# Patient Record
Sex: FEMALE | Race: BLACK OR AFRICAN AMERICAN | NOT HISPANIC OR LATINO
[De-identification: names, ages, dates, MRNs, and addresses within clinical notes are randomized per-mention and may not be internally consistent; named-entity substitution may affect disease eponyms.]

---

## 2021-03-08 PROBLEM — E13.9 DIABETES MELLITUS OF OTHER TYPE WITHOUT COMPLICATION: Status: RESOLVED | Noted: 2021-03-08 | Resolved: 2021-03-08

## 2021-03-08 PROBLEM — Z78.9 NON-SMOKER: Status: ACTIVE | Noted: 2021-03-08

## 2021-03-08 PROBLEM — Z86.79 HISTORY OF HYPERTENSION: Status: RESOLVED | Noted: 2021-03-08 | Resolved: 2021-03-08

## 2021-03-08 RX ORDER — AMLODIPINE BESYLATE 5 MG/1
TABLET ORAL
Refills: 0 | Status: ACTIVE | COMMUNITY

## 2021-03-08 RX ORDER — GLIMEPIRIDE 4 MG/1
TABLET ORAL
Refills: 0 | Status: ACTIVE | COMMUNITY

## 2021-03-08 RX ORDER — INSULIN DETEMIR 100 [IU]/ML
INJECTION, SOLUTION SUBCUTANEOUS
Refills: 0 | Status: ACTIVE | COMMUNITY

## 2021-03-08 RX ORDER — METFORMIN HYDROCHLORIDE 625 MG/1
TABLET ORAL
Refills: 0 | Status: ACTIVE | COMMUNITY

## 2021-03-09 ENCOUNTER — NON-APPOINTMENT (OUTPATIENT)
Age: 46
End: 2021-03-09

## 2021-03-09 ENCOUNTER — APPOINTMENT (OUTPATIENT)
Dept: GYNECOLOGIC ONCOLOGY | Facility: CLINIC | Age: 46
End: 2021-03-09
Payer: COMMERCIAL

## 2021-03-09 VITALS
RESPIRATION RATE: 16 BRPM | SYSTOLIC BLOOD PRESSURE: 159 MMHG | DIASTOLIC BLOOD PRESSURE: 99 MMHG | HEART RATE: 94 BPM | HEIGHT: 66 IN | WEIGHT: 238 LBS | BODY MASS INDEX: 38.25 KG/M2

## 2021-03-09 DIAGNOSIS — E13.9 OTHER SPECIFIED DIABETES MELLITUS W/OUT COMPLICATIONS: ICD-10-CM

## 2021-03-09 DIAGNOSIS — Z78.9 OTHER SPECIFIED HEALTH STATUS: ICD-10-CM

## 2021-03-09 DIAGNOSIS — Z86.79 PERSONAL HISTORY OF OTHER DISEASES OF THE CIRCULATORY SYSTEM: ICD-10-CM

## 2021-03-09 PROCEDURE — 99072 ADDL SUPL MATRL&STAF TM PHE: CPT

## 2021-03-09 PROCEDURE — 99205 OFFICE O/P NEW HI 60 MIN: CPT

## 2021-03-09 NOTE — CHIEF COMPLAINT
[FreeTextEntry1] : [facility]\par [place] Office\par [address]\[place] [address]\par

## 2021-03-09 NOTE — ASSESSMENT
[FreeTextEntry1] : 44 yo female with fibroid uterus with possible sarcomatous changes. I discussed with patient in office today many options including surgical vs. non surgical. Non surgical options include a UAE. I discussed that she would need an EMB prior to rule out a malignancy which I discuss is low likelihood and a MRI. I discussed that her fibroid is rather larger and it may take time for the fibroid to be reabsorbed by her body. I discussed that when there is concern for a fibroid being related to a malignancy: being > 10 cm and imaging with concerning features which she has, and heavy and AUB which she does. I still believe she is at a low risk of a malignancy.\par \par \par I discussed at length with the patient the nature, purpose, risks, benefits, and alternatives of total abdominal hysterectomy and bilateral salpingectomy via a vertical, midline incision, possible bilateral pelvic and para-aortic lymphadenectomy and surgical staging.  The patient understands the risks to include (but not be limited to) bowel injury, bleeding (with the possible need for transfusion), bladder or ureteral injury, infections, protracted wound closure, deep venous thrombosis, and aire-operative death.  She is also aware of  the possibility of  a unrecognized surgical complication with need for subsequent re-exploration.  She also understands the rationale for surgical procedures such as omentectomy, or pelvic and para-aortic lymphadenectomy for the proper staging of a gynecological cancer.  She agrees to proceed.  She asked numerous questions which were answered to her satisfaction.  She understands the need for a pre-operative bowel preparation and agrees to comply with our instructions.

## 2021-03-09 NOTE — PHYSICAL EXAM
[Normal] : Examination for hernias: No hernia appreciated [de-identified] : Uterus mobile, concern for pedunculated fibroid, do not suspect it to be BENJY.  [de-identified] : Patient was interviewed and examined with chaperone present. Name of Chaperone: Prema Evangelista PA-C

## 2021-03-09 NOTE — END OF VISIT
[FreeTextEntry3] : This note accurately reflects the work and decisions made by me.\par Written by Prema Evangelista PA-C acting as a scribe for Dr. Katelynn Betancourt.

## 2021-03-09 NOTE — HISTORY OF PRESENT ILLNESS
[FreeTextEntry1] : 46 yo  via 1  LMP 2019, on Depoprovera (last inj. 2021) being referred by Dr. Saucedo for fibroids. MRI performed at  radiology 21 with multiple uterine fibroids with a dominant 14 cm fibroid seen which centrally has a 6 cm area of no significant enhancement and may represent red degeneration or sarcomatous changes. Left ovarian cystic changes with some adjacent fluid and/or lymphoceles. Patient reports she has been feeling pressure around her umbilicus as well as lower back pain and urinary frequency x a few months. She denies unintentional weight loss, dyspareunia, post coital bleeding, pelvic pain, change in bowel function. She denies having any biopsy done and no longer desires child bearing. \par \par Lpap: 2021 WNL per pt\par Lmammo: 2020 return in 6 months due to family history of breast cysts\par

## 2021-03-12 ENCOUNTER — NON-APPOINTMENT (OUTPATIENT)
Age: 46
End: 2021-03-12

## 2021-06-04 ENCOUNTER — APPOINTMENT (OUTPATIENT)
Dept: GYNECOLOGIC ONCOLOGY | Facility: CLINIC | Age: 46
End: 2021-06-04
Payer: COMMERCIAL

## 2021-06-04 PROCEDURE — 99441: CPT

## 2021-06-04 NOTE — END OF VISIT
[FreeTextEntry3] : Proceed with planned surgery [Time Spent: ___ minutes] : I have spent [unfilled] minutes of time on the encounter.

## 2021-06-04 NOTE — HISTORY OF PRESENT ILLNESS
[Home] : at home, [unfilled] , at the time of the visit. [Medical Office: ([facility])___] : at the medical office located in  [Verbal consent obtained from patient] : the patient, [unfilled] [FreeTextEntry1] : Pt is a 45 yo with large fibroid uterus with concern for sarcomatous degeneration. Recommendation for STARR, BS. The patient presents with her  regarding questions about surgery.

## 2021-06-04 NOTE — ASSESSMENT
[FreeTextEntry1] : Pt is a 45 yo with large fibroid uterus and imaging concerning for sarcomatous degeneration. She is scheduled for STARR, BS on 6/23. All questions  answered regarding surgical recovering, length of stay, type of surgical incision. Proceed with planned surgery.

## 2021-06-09 ENCOUNTER — OUTPATIENT (OUTPATIENT)
Dept: OUTPATIENT SERVICES | Facility: HOSPITAL | Age: 46
LOS: 1 days | End: 2021-06-09
Payer: COMMERCIAL

## 2021-06-09 ENCOUNTER — TRANSCRIPTION ENCOUNTER (OUTPATIENT)
Age: 46
End: 2021-06-09

## 2021-06-09 VITALS
TEMPERATURE: 97 F | RESPIRATION RATE: 20 BRPM | SYSTOLIC BLOOD PRESSURE: 130 MMHG | WEIGHT: 237.88 LBS | HEART RATE: 73 BPM | DIASTOLIC BLOOD PRESSURE: 94 MMHG | HEIGHT: 68 IN

## 2021-06-09 DIAGNOSIS — Z29.9 ENCOUNTER FOR PROPHYLACTIC MEASURES, UNSPECIFIED: ICD-10-CM

## 2021-06-09 DIAGNOSIS — Z01.818 ENCOUNTER FOR OTHER PREPROCEDURAL EXAMINATION: ICD-10-CM

## 2021-06-09 DIAGNOSIS — U07.1 COVID-19: ICD-10-CM

## 2021-06-09 DIAGNOSIS — D21.9 BENIGN NEOPLASM OF CONNECTIVE AND OTHER SOFT TISSUE, UNSPECIFIED: ICD-10-CM

## 2021-06-09 DIAGNOSIS — I10 ESSENTIAL (PRIMARY) HYPERTENSION: ICD-10-CM

## 2021-06-09 DIAGNOSIS — E11.9 TYPE 2 DIABETES MELLITUS WITHOUT COMPLICATIONS: ICD-10-CM

## 2021-06-09 LAB
A1C WITH ESTIMATED AVERAGE GLUCOSE RESULT: 6.9 % — HIGH (ref 4–5.6)
ANION GAP SERPL CALC-SCNC: 12 MMOL/L — SIGNIFICANT CHANGE UP (ref 5–17)
APTT BLD: 33.7 SEC — SIGNIFICANT CHANGE UP (ref 27.5–35.5)
BASOPHILS # BLD AUTO: 0.03 K/UL — SIGNIFICANT CHANGE UP (ref 0–0.2)
BASOPHILS NFR BLD AUTO: 0.4 % — SIGNIFICANT CHANGE UP (ref 0–2)
BLD GP AB SCN SERPL QL: SIGNIFICANT CHANGE UP
BUN SERPL-MCNC: 10.3 MG/DL — SIGNIFICANT CHANGE UP (ref 8–20)
CALCIUM SERPL-MCNC: 9.4 MG/DL — SIGNIFICANT CHANGE UP (ref 8.6–10.2)
CHLORIDE SERPL-SCNC: 109 MMOL/L — HIGH (ref 98–107)
CO2 SERPL-SCNC: 21 MMOL/L — LOW (ref 22–29)
CREAT SERPL-MCNC: 0.89 MG/DL — SIGNIFICANT CHANGE UP (ref 0.5–1.3)
EOSINOPHIL # BLD AUTO: 0.1 K/UL — SIGNIFICANT CHANGE UP (ref 0–0.5)
EOSINOPHIL NFR BLD AUTO: 1.5 % — SIGNIFICANT CHANGE UP (ref 0–6)
ESTIMATED AVERAGE GLUCOSE: 151 MG/DL — HIGH (ref 68–114)
GLUCOSE SERPL-MCNC: 93 MG/DL — SIGNIFICANT CHANGE UP (ref 70–99)
HCG SERPL-ACNC: <4 MIU/ML — SIGNIFICANT CHANGE UP
HCT VFR BLD CALC: 38.3 % — SIGNIFICANT CHANGE UP (ref 34.5–45)
HGB BLD-MCNC: 12.2 G/DL — SIGNIFICANT CHANGE UP (ref 11.5–15.5)
IMM GRANULOCYTES NFR BLD AUTO: 0.4 % — SIGNIFICANT CHANGE UP (ref 0–1.5)
INR BLD: 1.15 RATIO — SIGNIFICANT CHANGE UP (ref 0.88–1.16)
LYMPHOCYTES # BLD AUTO: 2.62 K/UL — SIGNIFICANT CHANGE UP (ref 1–3.3)
LYMPHOCYTES # BLD AUTO: 39.2 % — SIGNIFICANT CHANGE UP (ref 13–44)
MCHC RBC-ENTMCNC: 27.5 PG — SIGNIFICANT CHANGE UP (ref 27–34)
MCHC RBC-ENTMCNC: 31.9 GM/DL — LOW (ref 32–36)
MCV RBC AUTO: 86.5 FL — SIGNIFICANT CHANGE UP (ref 80–100)
MONOCYTES # BLD AUTO: 0.35 K/UL — SIGNIFICANT CHANGE UP (ref 0–0.9)
MONOCYTES NFR BLD AUTO: 5.2 % — SIGNIFICANT CHANGE UP (ref 2–14)
NEUTROPHILS # BLD AUTO: 3.55 K/UL — SIGNIFICANT CHANGE UP (ref 1.8–7.4)
NEUTROPHILS NFR BLD AUTO: 53.3 % — SIGNIFICANT CHANGE UP (ref 43–77)
PLATELET # BLD AUTO: 202 K/UL — SIGNIFICANT CHANGE UP (ref 150–400)
POTASSIUM SERPL-MCNC: 4.3 MMOL/L — SIGNIFICANT CHANGE UP (ref 3.5–5.3)
POTASSIUM SERPL-SCNC: 4.3 MMOL/L — SIGNIFICANT CHANGE UP (ref 3.5–5.3)
PROTHROM AB SERPL-ACNC: 13.2 SEC — SIGNIFICANT CHANGE UP (ref 10.6–13.6)
RBC # BLD: 4.43 M/UL — SIGNIFICANT CHANGE UP (ref 3.8–5.2)
RBC # FLD: 13.6 % — SIGNIFICANT CHANGE UP (ref 10.3–14.5)
SODIUM SERPL-SCNC: 142 MMOL/L — SIGNIFICANT CHANGE UP (ref 135–145)
WBC # BLD: 6.68 K/UL — SIGNIFICANT CHANGE UP (ref 3.8–10.5)
WBC # FLD AUTO: 6.68 K/UL — SIGNIFICANT CHANGE UP (ref 3.8–10.5)

## 2021-06-09 PROCEDURE — 93010 ELECTROCARDIOGRAM REPORT: CPT

## 2021-06-09 PROCEDURE — 93005 ELECTROCARDIOGRAM TRACING: CPT

## 2021-06-09 PROCEDURE — G0463: CPT

## 2021-06-09 RX ORDER — CEFAZOLIN SODIUM 1 G
2000 VIAL (EA) INJECTION ONCE
Refills: 0 | Status: COMPLETED | OUTPATIENT
Start: 2021-06-18 | End: 2021-06-18

## 2021-06-09 RX ORDER — SODIUM CHLORIDE 9 MG/ML
3 INJECTION INTRAMUSCULAR; INTRAVENOUS; SUBCUTANEOUS EVERY 8 HOURS
Refills: 0 | Status: DISCONTINUED | OUTPATIENT
Start: 2021-06-18 | End: 2021-06-18

## 2021-06-09 RX ORDER — METRONIDAZOLE 500 MG
500 TABLET ORAL ONCE
Refills: 0 | Status: COMPLETED | OUTPATIENT
Start: 2021-06-18 | End: 2021-06-18

## 2021-06-09 NOTE — H&P PST ADULT - BLOOD AVOIDANCE/RESTRICTIONS, PROFILE
Pt returned call, appt moved to 8/26, no need to hold blood thinner, instructions mailed, pre-auth submitted.    none

## 2021-06-09 NOTE — H&P PST ADULT - NSANTHOSAYNRD_GEN_A_CORE
No. NEHEMIAH screening performed.  STOP BANG Legend: 0-2 = LOW Risk; 3-4 = INTERMEDIATE Risk; 5-8 = HIGH Risk

## 2021-06-09 NOTE — H&P PST ADULT - NSICDXPASTMEDICALHX_GEN_ALL_CORE_FT
PAST MEDICAL HISTORY:  Diabetes     Fibroids     GERD (Gastroesophageal Reflux Disease)     HTN (Hypertension)      PAST MEDICAL HISTORY:  Anemia     Diabetes     Fibroids     GERD (Gastroesophageal Reflux Disease)     HTN (Hypertension)

## 2021-06-09 NOTE — PATIENT PROFILE ADULT - VISION (WITH CORRECTIVE LENSES IF THE PATIENT USUALLY WEARS THEM):
Glasses for distance and night driving/Partially impaired: cannot see medication labels or newsprint, but can see obstacles in path, and the surrounding layout; can count fingers at arm's length

## 2021-06-09 NOTE — H&P PST ADULT - NSICDXFAMILYHX_GEN_ALL_CORE_FT
FAMILY HISTORY:  Father  Still living? Yes, Estimated age: 61-70  Family history of DVT, Age at diagnosis: Age Unknown  FH: HTN (hypertension), Age at diagnosis: Age Unknown  FH: type 2 diabetes, Age at diagnosis: Age Unknown    Mother  Still living? Yes, Estimated age: 61-70  FH: CHF (congestive heart failure), Age at diagnosis: Age Unknown  FH: HTN (hypertension), Age at diagnosis: Age Unknown  FH: type 2 diabetes, Age at diagnosis: Age Unknown

## 2021-06-09 NOTE — PATIENT PROFILE ADULT - NSPROHMSYMPCOND_GEN_A_NUR
NP, instructed pt on pre-op instructions/teaching, tips for safer surgery, pain management scale, pre-surgical infection prevention instructions, covid swab appt info given for (6/15), medical and cardiac clearances pending, diabetes club info given. Pt verbalized understanding of all instructions given./diabetes NP, instructed pt on pre-op instructions/teaching, tips for safer surgery, pain management scale, pre-surgical infection prevention instructions, covid swab appt info given for (6/15), follow colon prep as per surgeon, medical and cardiac clearances pending, diabetes club info given. Pt verbalized understanding of all instructions given./diabetes

## 2021-06-09 NOTE — H&P PST ADULT - NSCAFFEINETYPE_GEN_ALL_CORE_SD
Prescription refill request on:      Disp Refills Start End    insulin glargine (LANTUS) 100 UNIT/ML vial solution 60 mL 0 10/31/2019     Sig: Inject 10 units into the skin every morning and 42 units nightly    Sent to pharmacy as: Insulin Glargine 100 UNIT/ML Subcutaneous Solution    Class: Eprescribe      Last A1C 10/29/2019  Last office visit 10/31/2019  Next appointment none     tea

## 2021-06-09 NOTE — H&P PST ADULT - ASSESSMENT
46 yr old female in NAD presents with c/o fibroids and lower back discomfort. pt has history of, diabetes, htn  and  anemia with heavy periods, treated with depo provera injection last given 2021 . Pt is schedules for hysterectomy with DR. Betancourt.   OPIOID RISK TOOL    CARLIE EACH BOX THAT APPLIES AND ADD TOTALS AT THE END    FAMILY HISTORY OF SUBSTANCE ABUSE                 FEMALE         MALE                                                Alcohol                             [  ]1 pt          [  ]3pts                                               Illegal Durgs                     [  ]2 pts        [  ]3pts                                               Rx Drugs                           [  ]4 pts        [  ]4 pts    PERSONAL HISTORY OF SUBSTANCE ABUSE                                                                                          Alcohol                             [  ]3 pts       [  ]3 pts                                               Illegal Durgs                     [  ]4 pts        [  ]4 pts                                               Rx Drugs                           [  ]5 pts        [  ]5 pts    AGE BETWEEN 16-45 YEARS                                      [X  ]1 pt         [  ]1 pt    HISTORY OF PREADOLESCENT   SEXUAL ABUSE                                                             [  ]3 pts        [  ]0pts    PSYCHOLOGICAL DISEASE                     ADD, OCD, Bipolar, Schizophrenia        [  ]2 pts         [  ]2 pts                      Depression                                               [  ]1 pt           [  ]1 pt           SCORING TOTAL   (add numbers and type here)              (**1*)                                     A score of 3 or lower indicated LOW risk for future opiod abuse  A score of 4 to 7 indicated moderate risk for future opiod abuse  A score of 8 or higher indicates a high risk for opiod abuse  CAPRINI VTE 2.0 SCORE [CLOT updated 2019]    AGE RELATED RISK FACTORS                                                       MOBILITY RELATED FACTORS  [ X] Age 41-60 years                                            (1 Point)                    [ ] Bed rest                                                        (1 Point)  [ ] Age: 61-74 years                                           (2 Points)                  [ ] Plaster cast                                                   (2 Points)  [ ] Age= 75 years                                              (3 Points)                    [ ] Bed bound for more than 72 hours                 (2 Points)    DISEASE RELATED RISK FACTORS                                               GENDER SPECIFIC FACTORS  [ ] Edema in the lower extremities                       (1 Point)              [ ] Pregnancy                                                     (1 Point)  [ ] Varicose veins                                               (1 Point)                     [ ] Post-partum < 6 weeks                                   (1 Point)             [X ] BMI > 25 Kg/m2                                            (1 Point)                     [ ] Hormonal therapy  or oral contraception          (1 Point)                 [ ] Sepsis (in the previous month)                        (1 Point)               [ ] History of pregnancy complications                 (1 point)  [ ] Pneumonia or serious lung disease                                               [ ] Unexplained or recurrent                     (1 Point)           (in the previous month)                               (1 Point)  [ ] Abnormal pulmonary function test                     (1 Point)                 SURGERY RELATED RISK FACTORS  [ ] Acute myocardial infarction                              (1 Point)               [ ]  Section                                             (1 Point)  [ ] Congestive heart failure (in the previous month)  (1 Point)      [ ] Minor surgery                                                  (1 Point)   [ ] Inflammatory bowel disease                             (1 Point)               [ ] Arthroscopic surgery                                        (2 Points)  [ ] Central venous access                                      (2 Points)                [X ] General surgery lasting more than 45 minutes (2 points)  [ ] Malignancy- Present or previous                   (2 Points)                [ ] Elective arthroplasty                                         (5 points)    [ ] Stroke (in the previous month)                          (5 Points)                                                                                                                                                           HEMATOLOGY RELATED FACTORS                                                 TRAUMA RELATED RISK FACTORS  [ ] Prior episodes of VTE                                     (3 Points)                [ ] Fracture of the hip, pelvis, or leg                       (5 Points)  [ ] Positive family history for VTE                         (3 Points)             [ ] Acute spinal cord injury (in the previous month)  (5 Points)  [ ] Prothrombin 77550 A                                     (3 Points)               [ ] Paralysis  (less than 1 month)                             (5 Points)  [ ] Factor V Leiden                                             (3 Points)                  [ ] Multiple Trauma within 1 month                        (5 Points)  [ ] Lupus anticoagulants                                     (3 Points)                                                           [ ] Anticardiolipin antibodies                               (3 Points)                                                       [ ] High homocysteine in the blood                      (3 Points)                                             [ ] Other congenital or acquired thrombophilia      (3 Points)                                                [ ] Heparin induced thrombocytopenia                  (3 Points)                                     Total Score [     4     ]

## 2021-06-09 NOTE — H&P PST ADULT - NSICDXPROBLEM_GEN_ALL_CORE_FT
PROBLEM DIAGNOSES  Problem: Need for prophylactic measure  Assessment and Plan: caprini score 4     Problem: Fibroids  Assessment and Plan: total abdominal hysterectomy , bilateral salpingectomy    Problem: 2019 novel coronavirus disease (COVID-19)  Assessment and Plan: pre op pcr    Problem: Diabetes  Assessment and Plan: fingerstick on admit   medical clearance     Problem: Benign essential HTN  Assessment and Plan:     Problem: HTN (hypertension)  Assessment and Plan: cardiac clearance       PROBLEM DIAGNOSES  Problem: Need for prophylactic measure  Assessment and Plan: caprini score 4     Problem: Fibroids  Assessment and Plan: total abdominal hysterectomy , bilateral salpingectomy    Problem: 2019 novel coronavirus disease (COVID-19)  Assessment and Plan: pre op pcr    Problem: Diabetes  Assessment and Plan: fingerstick on admit   medical clearance     Problem: Benign essential HTN  Assessment and Plan:     Problem: HTN (hypertension)  Assessment and Plan: medical  clearance

## 2021-06-11 DIAGNOSIS — Z01.818 ENCOUNTER FOR OTHER PREPROCEDURAL EXAMINATION: ICD-10-CM

## 2021-06-15 ENCOUNTER — APPOINTMENT (OUTPATIENT)
Dept: DISASTER EMERGENCY | Facility: CLINIC | Age: 46
End: 2021-06-15

## 2021-06-15 LAB — SARS-COV-2 N GENE NPH QL NAA+PROBE: NOT DETECTED

## 2021-06-17 ENCOUNTER — FORM ENCOUNTER (OUTPATIENT)
Age: 46
End: 2021-06-17

## 2021-06-18 ENCOUNTER — INPATIENT (INPATIENT)
Facility: HOSPITAL | Age: 46
LOS: 4 days | Discharge: HOME | DRG: 743 | End: 2021-06-23
Attending: OBSTETRICS & GYNECOLOGY | Admitting: OBSTETRICS & GYNECOLOGY
Payer: COMMERCIAL

## 2021-06-18 ENCOUNTER — RESULT REVIEW (OUTPATIENT)
Age: 46
End: 2021-06-18

## 2021-06-18 ENCOUNTER — TRANSCRIPTION ENCOUNTER (OUTPATIENT)
Age: 46
End: 2021-06-18

## 2021-06-18 VITALS
TEMPERATURE: 98 F | HEIGHT: 68 IN | OXYGEN SATURATION: 100 % | DIASTOLIC BLOOD PRESSURE: 84 MMHG | RESPIRATION RATE: 16 BRPM | SYSTOLIC BLOOD PRESSURE: 128 MMHG | HEART RATE: 80 BPM | WEIGHT: 235.89 LBS

## 2021-06-18 DIAGNOSIS — D25.9 LEIOMYOMA OF UTERUS, UNSPECIFIED: ICD-10-CM

## 2021-06-18 LAB
ABO RH CONFIRMATION: SIGNIFICANT CHANGE UP
GLUCOSE BLDC GLUCOMTR-MCNC: 118 MG/DL — HIGH (ref 70–99)
GLUCOSE BLDC GLUCOMTR-MCNC: 193 MG/DL — HIGH (ref 70–99)
GLUCOSE BLDC GLUCOMTR-MCNC: 194 MG/DL — HIGH (ref 70–99)

## 2021-06-18 PROCEDURE — 58925 REMOVAL OF OVARIAN CYST(S): CPT | Mod: 80,XS

## 2021-06-18 PROCEDURE — 58150 TOTAL HYSTERECTOMY: CPT | Mod: 80

## 2021-06-18 PROCEDURE — 88307 TISSUE EXAM BY PATHOLOGIST: CPT | Mod: 26

## 2021-06-18 PROCEDURE — 58150 TOTAL HYSTERECTOMY: CPT

## 2021-06-18 PROCEDURE — 58925 REMOVAL OF OVARIAN CYST(S): CPT | Mod: XS

## 2021-06-18 PROCEDURE — 88305 TISSUE EXAM BY PATHOLOGIST: CPT | Mod: 26

## 2021-06-18 RX ORDER — ONDANSETRON 8 MG/1
4 TABLET, FILM COATED ORAL ONCE
Refills: 0 | Status: DISCONTINUED | OUTPATIENT
Start: 2021-06-18 | End: 2021-06-18

## 2021-06-18 RX ORDER — SODIUM CHLORIDE 9 MG/ML
1000 INJECTION, SOLUTION INTRAVENOUS
Refills: 0 | Status: DISCONTINUED | OUTPATIENT
Start: 2021-06-18 | End: 2021-06-23

## 2021-06-18 RX ORDER — FENTANYL CITRATE 50 UG/ML
25 INJECTION INTRAVENOUS
Refills: 0 | Status: DISCONTINUED | OUTPATIENT
Start: 2021-06-18 | End: 2021-06-18

## 2021-06-18 RX ORDER — INSULIN DETEMIR 100/ML (3)
30 INSULIN PEN (ML) SUBCUTANEOUS
Qty: 0 | Refills: 0 | DISCHARGE

## 2021-06-18 RX ORDER — SIMETHICONE 80 MG/1
80 TABLET, CHEWABLE ORAL EVERY 6 HOURS
Refills: 0 | Status: DISCONTINUED | OUTPATIENT
Start: 2021-06-18 | End: 2021-06-23

## 2021-06-18 RX ORDER — METOPROLOL TARTRATE 50 MG
50 TABLET ORAL DAILY
Refills: 0 | Status: DISCONTINUED | OUTPATIENT
Start: 2021-06-19 | End: 2021-06-23

## 2021-06-18 RX ORDER — KETOROLAC TROMETHAMINE 30 MG/ML
30 SYRINGE (ML) INJECTION EVERY 6 HOURS
Refills: 0 | Status: DISCONTINUED | OUTPATIENT
Start: 2021-06-18 | End: 2021-06-20

## 2021-06-18 RX ORDER — DEXTROSE 50 % IN WATER 50 %
12.5 SYRINGE (ML) INTRAVENOUS ONCE
Refills: 0 | Status: DISCONTINUED | OUTPATIENT
Start: 2021-06-18 | End: 2021-06-23

## 2021-06-18 RX ORDER — ACETAMINOPHEN 500 MG
1000 TABLET ORAL EVERY 6 HOURS
Refills: 0 | Status: DISCONTINUED | OUTPATIENT
Start: 2021-06-18 | End: 2021-06-23

## 2021-06-18 RX ORDER — AMLODIPINE BESYLATE 2.5 MG/1
10 TABLET ORAL DAILY
Refills: 0 | Status: DISCONTINUED | OUTPATIENT
Start: 2021-06-19 | End: 2021-06-23

## 2021-06-18 RX ORDER — INSULIN LISPRO 100/ML
VIAL (ML) SUBCUTANEOUS AT BEDTIME
Refills: 0 | Status: DISCONTINUED | OUTPATIENT
Start: 2021-06-18 | End: 2021-06-23

## 2021-06-18 RX ORDER — INSULIN LISPRO 100/ML
VIAL (ML) SUBCUTANEOUS
Refills: 0 | Status: DISCONTINUED | OUTPATIENT
Start: 2021-06-18 | End: 2021-06-23

## 2021-06-18 RX ORDER — ENOXAPARIN SODIUM 100 MG/ML
40 INJECTION SUBCUTANEOUS DAILY
Refills: 0 | Status: DISCONTINUED | OUTPATIENT
Start: 2021-06-18 | End: 2021-06-23

## 2021-06-18 RX ORDER — GLIMEPIRIDE 1 MG
1 TABLET ORAL
Qty: 0 | Refills: 0 | DISCHARGE

## 2021-06-18 RX ORDER — SODIUM CHLORIDE 9 MG/ML
1000 INJECTION, SOLUTION INTRAVENOUS
Refills: 0 | Status: DISCONTINUED | OUTPATIENT
Start: 2021-06-18 | End: 2021-06-21

## 2021-06-18 RX ORDER — ONDANSETRON 8 MG/1
4 TABLET, FILM COATED ORAL EVERY 6 HOURS
Refills: 0 | Status: DISCONTINUED | OUTPATIENT
Start: 2021-06-18 | End: 2021-06-23

## 2021-06-18 RX ORDER — DEXTROSE 50 % IN WATER 50 %
15 SYRINGE (ML) INTRAVENOUS ONCE
Refills: 0 | Status: DISCONTINUED | OUTPATIENT
Start: 2021-06-18 | End: 2021-06-23

## 2021-06-18 RX ORDER — CHOLECALCIFEROL (VITAMIN D3) 125 MCG
0 CAPSULE ORAL
Qty: 0 | Refills: 0 | DISCHARGE

## 2021-06-18 RX ORDER — METOPROLOL TARTRATE 50 MG
1 TABLET ORAL
Qty: 0 | Refills: 0 | DISCHARGE

## 2021-06-18 RX ORDER — AMLODIPINE BESYLATE 2.5 MG/1
1 TABLET ORAL
Qty: 0 | Refills: 0 | DISCHARGE

## 2021-06-18 RX ORDER — ASPIRIN/CALCIUM CARB/MAGNESIUM 324 MG
0 TABLET ORAL
Qty: 0 | Refills: 0 | DISCHARGE

## 2021-06-18 RX ORDER — ACETAMINOPHEN 500 MG
975 TABLET ORAL EVERY 6 HOURS
Refills: 0 | Status: DISCONTINUED | OUTPATIENT
Start: 2021-06-18 | End: 2021-06-23

## 2021-06-18 RX ORDER — HYDROMORPHONE HYDROCHLORIDE 2 MG/ML
0.2 INJECTION INTRAMUSCULAR; INTRAVENOUS; SUBCUTANEOUS
Refills: 0 | Status: DISCONTINUED | OUTPATIENT
Start: 2021-06-18 | End: 2021-06-22

## 2021-06-18 RX ORDER — SODIUM CHLORIDE 9 MG/ML
1000 INJECTION, SOLUTION INTRAVENOUS
Refills: 0 | Status: DISCONTINUED | OUTPATIENT
Start: 2021-06-18 | End: 2021-06-18

## 2021-06-18 RX ORDER — DEXTROSE 50 % IN WATER 50 %
25 SYRINGE (ML) INTRAVENOUS ONCE
Refills: 0 | Status: DISCONTINUED | OUTPATIENT
Start: 2021-06-18 | End: 2021-06-23

## 2021-06-18 RX ORDER — ONDANSETRON 8 MG/1
8 TABLET, FILM COATED ORAL EVERY 8 HOURS
Refills: 0 | Status: DISCONTINUED | OUTPATIENT
Start: 2021-06-18 | End: 2021-06-23

## 2021-06-18 RX ORDER — HYDROMORPHONE HYDROCHLORIDE 2 MG/ML
0.5 INJECTION INTRAMUSCULAR; INTRAVENOUS; SUBCUTANEOUS
Refills: 0 | Status: DISCONTINUED | OUTPATIENT
Start: 2021-06-18 | End: 2021-06-18

## 2021-06-18 RX ORDER — METFORMIN HYDROCHLORIDE 850 MG/1
1 TABLET ORAL
Qty: 0 | Refills: 0 | DISCHARGE

## 2021-06-18 RX ORDER — OXYCODONE HYDROCHLORIDE 5 MG/1
10 TABLET ORAL EVERY 4 HOURS
Refills: 0 | Status: DISCONTINUED | OUTPATIENT
Start: 2021-06-18 | End: 2021-06-23

## 2021-06-18 RX ORDER — OXYCODONE HYDROCHLORIDE 5 MG/1
5 TABLET ORAL
Refills: 0 | Status: DISCONTINUED | OUTPATIENT
Start: 2021-06-18 | End: 2021-06-23

## 2021-06-18 RX ORDER — GLUCAGON INJECTION, SOLUTION 0.5 MG/.1ML
1 INJECTION, SOLUTION SUBCUTANEOUS ONCE
Refills: 0 | Status: DISCONTINUED | OUTPATIENT
Start: 2021-06-18 | End: 2021-06-23

## 2021-06-18 RX ADMIN — HYDROMORPHONE HYDROCHLORIDE 0.5 MILLIGRAM(S): 2 INJECTION INTRAMUSCULAR; INTRAVENOUS; SUBCUTANEOUS at 17:17

## 2021-06-18 RX ADMIN — ENOXAPARIN SODIUM 40 MILLIGRAM(S): 100 INJECTION SUBCUTANEOUS at 23:08

## 2021-06-18 RX ADMIN — Medication 975 MILLIGRAM(S): at 23:08

## 2021-06-18 RX ADMIN — Medication 200 MILLIGRAM(S): at 14:15

## 2021-06-18 RX ADMIN — Medication 30 MILLIGRAM(S): at 23:08

## 2021-06-18 RX ADMIN — HYDROMORPHONE HYDROCHLORIDE 0.5 MILLIGRAM(S): 2 INJECTION INTRAMUSCULAR; INTRAVENOUS; SUBCUTANEOUS at 17:34

## 2021-06-18 RX ADMIN — HYDROMORPHONE HYDROCHLORIDE 0.5 MILLIGRAM(S): 2 INJECTION INTRAMUSCULAR; INTRAVENOUS; SUBCUTANEOUS at 17:33

## 2021-06-18 RX ADMIN — SODIUM CHLORIDE 125 MILLILITER(S): 9 INJECTION, SOLUTION INTRAVENOUS at 20:14

## 2021-06-18 RX ADMIN — OXYCODONE HYDROCHLORIDE 10 MILLIGRAM(S): 5 TABLET ORAL at 21:00

## 2021-06-18 RX ADMIN — OXYCODONE HYDROCHLORIDE 10 MILLIGRAM(S): 5 TABLET ORAL at 20:23

## 2021-06-18 RX ADMIN — ONDANSETRON 4 MILLIGRAM(S): 8 TABLET, FILM COATED ORAL at 20:14

## 2021-06-18 RX ADMIN — HYDROMORPHONE HYDROCHLORIDE 0.5 MILLIGRAM(S): 2 INJECTION INTRAMUSCULAR; INTRAVENOUS; SUBCUTANEOUS at 18:10

## 2021-06-18 RX ADMIN — Medication 100 MILLIGRAM(S): at 14:15

## 2021-06-18 NOTE — BRIEF OPERATIVE NOTE - OPERATION/FINDINGS
Large 20week size myomatous uterus. Grossly normal appearing fallopian tubes, bilaterally. Right ovarian simple cyst noted and removed.

## 2021-06-18 NOTE — DISCHARGE NOTE PROVIDER - NSDCFUADDAPPT_GEN_ALL_CORE_FT
Follow-up with Dr. Betancourt in two weeks to review pathology report.  Follow-up with Dr. Betancourt two weeks from surgery to review pathology report and for full postop check with removal of staples.    May walk and climb stairs as often as youd like, no vigorous activity, do not lift anything greater than 10lbs, nothing per vagina x 6 weeks, do not drive while on pain medication.

## 2021-06-18 NOTE — DISCHARGE NOTE PROVIDER - NSDCACTIVITY_GEN_ALL_CORE
No restrictions/Showering allowed/Walking - Indoors allowed/No heavy lifting/straining/Walking - Outdoors allowed No restrictions/Showering allowed/Stairs allowed/Walking - Indoors allowed/No heavy lifting/straining/Walking - Outdoors allowed

## 2021-06-18 NOTE — BRIEF OPERATIVE NOTE - NSICDXBRIEFPROCEDURE_GEN_ALL_CORE_FT
PROCEDURES:  Hysterectomy, abdominal, total, with bilateral salpingectomy 18-Jun-2021 16:40:27  Rimpel, Katherinne  Ovarian cystectomy 18-Jun-2021 16:40:41  Rimpel, Katherinne

## 2021-06-18 NOTE — DISCHARGE NOTE PROVIDER - REASON FOR ADMISSION
surgery - total abdominal hysterectomy, bilateral salpingectomy  surgery - total abdominal hysterectomy, bilateral salpingectomy, R. ovarian cystectomy.

## 2021-06-18 NOTE — DISCHARGE NOTE PROVIDER - HOSPITAL COURSE
Patient post-operatively had an uncomplicated hospital course. Her pain was well controlled. She is tolerating a regular diet. She is ambulating independently. She was able to void after removal of ramirez. Patient with flatus. Labs and Vitals WNL upon discharge.

## 2021-06-18 NOTE — DISCHARGE NOTE PROVIDER - NSDCFUADDINST_GEN_ALL_CORE_FT
Please contact your provider for any pain uncontrolled by medication, excessive bleeding or Fever>100.4  Please take naprosyn 1 tablet every 12 hours x 3 days, may take percocet as prescribed for breakthrough pain.

## 2021-06-18 NOTE — DISCHARGE NOTE PROVIDER - NSDCMRMEDTOKEN_GEN_ALL_CORE_FT
amLODIPine 10 mg oral tablet: 1 tab(s) orally once a day  aspirin:     cloNIDine 0.1 mg oral tablet: 1 tab(s) orally 2 times a day  glimepiride 2 mg oral tablet: 1 tab(s) orally once a day  Levemir FlexTouch 100 units/mL subcutaneous solution: 30  subcutaneous once a day (at bedtime)  metFORMIN 1000 mg oral tablet: 1 tab(s) orally 2 times a day  multivitamin: 1  orally once a day  naproxen 500 mg oral tablet: 1 tab(s) orally 2 times a day  Norvasc 10 mg oral tablet:  orally   oxycodone-acetaminophen 5 mg-325 mg oral tablet: 1 tab(s) orally every 6 hours, As Needed - severe pain MDD:4 tabs  Toprol-XL 50 mg oral tablet, extended release: 1 tab(s) orally once a day  Vitamin D3 1000 intl units (25 mcg) oral tablet:

## 2021-06-18 NOTE — DISCHARGE NOTE PROVIDER - CARE PROVIDER_API CALL
Katelynn Betancourt)  [facility]  [address] [place] [address]  Phone: [phone]  Fax: [phone]  Follow Up Time:

## 2021-06-19 LAB
ANION GAP SERPL CALC-SCNC: 11 MMOL/L — SIGNIFICANT CHANGE UP (ref 5–17)
BASOPHILS # BLD AUTO: 0.01 K/UL — SIGNIFICANT CHANGE UP (ref 0–0.2)
BASOPHILS NFR BLD AUTO: 0.1 % — SIGNIFICANT CHANGE UP (ref 0–2)
BUN SERPL-MCNC: 5.3 MG/DL — LOW (ref 8–20)
CALCIUM SERPL-MCNC: 8.6 MG/DL — SIGNIFICANT CHANGE UP (ref 8.6–10.2)
CHLORIDE SERPL-SCNC: 105 MMOL/L — SIGNIFICANT CHANGE UP (ref 98–107)
CO2 SERPL-SCNC: 19 MMOL/L — LOW (ref 22–29)
COVID-19 SPIKE DOMAIN AB INTERP: POSITIVE
COVID-19 SPIKE DOMAIN ANTIBODY RESULT: >250 U/ML — HIGH
CREAT SERPL-MCNC: 0.69 MG/DL — SIGNIFICANT CHANGE UP (ref 0.5–1.3)
EOSINOPHIL # BLD AUTO: 0 K/UL — SIGNIFICANT CHANGE UP (ref 0–0.5)
EOSINOPHIL NFR BLD AUTO: 0 % — SIGNIFICANT CHANGE UP (ref 0–6)
GLUCOSE BLDC GLUCOMTR-MCNC: 126 MG/DL — HIGH (ref 70–99)
GLUCOSE BLDC GLUCOMTR-MCNC: 135 MG/DL — HIGH (ref 70–99)
GLUCOSE BLDC GLUCOMTR-MCNC: 150 MG/DL — HIGH (ref 70–99)
GLUCOSE BLDC GLUCOMTR-MCNC: 163 MG/DL — HIGH (ref 70–99)
GLUCOSE SERPL-MCNC: 141 MG/DL — HIGH (ref 70–99)
HCT VFR BLD CALC: 36.8 % — SIGNIFICANT CHANGE UP (ref 34.5–45)
HGB BLD-MCNC: 12 G/DL — SIGNIFICANT CHANGE UP (ref 11.5–15.5)
IMM GRANULOCYTES NFR BLD AUTO: 0.3 % — SIGNIFICANT CHANGE UP (ref 0–1.5)
LYMPHOCYTES # BLD AUTO: 2.52 K/UL — SIGNIFICANT CHANGE UP (ref 1–3.3)
LYMPHOCYTES # BLD AUTO: 21 % — SIGNIFICANT CHANGE UP (ref 13–44)
MAGNESIUM SERPL-MCNC: 2 MG/DL — SIGNIFICANT CHANGE UP (ref 1.6–2.6)
MCHC RBC-ENTMCNC: 27.7 PG — SIGNIFICANT CHANGE UP (ref 27–34)
MCHC RBC-ENTMCNC: 32.6 GM/DL — SIGNIFICANT CHANGE UP (ref 32–36)
MCV RBC AUTO: 85 FL — SIGNIFICANT CHANGE UP (ref 80–100)
MONOCYTES # BLD AUTO: 0.87 K/UL — SIGNIFICANT CHANGE UP (ref 0–0.9)
MONOCYTES NFR BLD AUTO: 7.3 % — SIGNIFICANT CHANGE UP (ref 2–14)
NEUTROPHILS # BLD AUTO: 8.56 K/UL — HIGH (ref 1.8–7.4)
NEUTROPHILS NFR BLD AUTO: 71.3 % — SIGNIFICANT CHANGE UP (ref 43–77)
PHOSPHATE SERPL-MCNC: 3.7 MG/DL — SIGNIFICANT CHANGE UP (ref 2.4–4.7)
PLATELET # BLD AUTO: 201 K/UL — SIGNIFICANT CHANGE UP (ref 150–400)
POTASSIUM SERPL-MCNC: 3.6 MMOL/L — SIGNIFICANT CHANGE UP (ref 3.5–5.3)
POTASSIUM SERPL-SCNC: 3.6 MMOL/L — SIGNIFICANT CHANGE UP (ref 3.5–5.3)
RBC # BLD: 4.33 M/UL — SIGNIFICANT CHANGE UP (ref 3.8–5.2)
RBC # FLD: 13.2 % — SIGNIFICANT CHANGE UP (ref 10.3–14.5)
SARS-COV-2 IGG+IGM SERPL QL IA: >250 U/ML — HIGH
SARS-COV-2 IGG+IGM SERPL QL IA: POSITIVE
SODIUM SERPL-SCNC: 135 MMOL/L — SIGNIFICANT CHANGE UP (ref 135–145)
WBC # BLD: 12 K/UL — HIGH (ref 3.8–10.5)
WBC # FLD AUTO: 12 K/UL — HIGH (ref 3.8–10.5)

## 2021-06-19 RX ADMIN — OXYCODONE HYDROCHLORIDE 10 MILLIGRAM(S): 5 TABLET ORAL at 02:45

## 2021-06-19 RX ADMIN — Medication 30 MILLIGRAM(S): at 06:32

## 2021-06-19 RX ADMIN — Medication 30 MILLIGRAM(S): at 00:00

## 2021-06-19 RX ADMIN — Medication 975 MILLIGRAM(S): at 18:39

## 2021-06-19 RX ADMIN — Medication 975 MILLIGRAM(S): at 23:17

## 2021-06-19 RX ADMIN — Medication 975 MILLIGRAM(S): at 11:07

## 2021-06-19 RX ADMIN — SODIUM CHLORIDE 125 MILLILITER(S): 9 INJECTION, SOLUTION INTRAVENOUS at 11:08

## 2021-06-19 RX ADMIN — OXYCODONE HYDROCHLORIDE 10 MILLIGRAM(S): 5 TABLET ORAL at 22:15

## 2021-06-19 RX ADMIN — ENOXAPARIN SODIUM 40 MILLIGRAM(S): 100 INJECTION SUBCUTANEOUS at 11:08

## 2021-06-19 RX ADMIN — Medication 30 MILLIGRAM(S): at 18:39

## 2021-06-19 RX ADMIN — Medication 30 MILLIGRAM(S): at 17:11

## 2021-06-19 RX ADMIN — Medication 30 MILLIGRAM(S): at 11:07

## 2021-06-19 RX ADMIN — Medication 0.1 MILLIGRAM(S): at 17:11

## 2021-06-19 RX ADMIN — Medication 975 MILLIGRAM(S): at 04:11

## 2021-06-19 RX ADMIN — Medication 975 MILLIGRAM(S): at 06:32

## 2021-06-19 RX ADMIN — Medication 975 MILLIGRAM(S): at 12:36

## 2021-06-19 RX ADMIN — OXYCODONE HYDROCHLORIDE 10 MILLIGRAM(S): 5 TABLET ORAL at 21:38

## 2021-06-19 RX ADMIN — Medication 30 MILLIGRAM(S): at 23:17

## 2021-06-19 RX ADMIN — Medication 975 MILLIGRAM(S): at 17:10

## 2021-06-19 RX ADMIN — OXYCODONE HYDROCHLORIDE 10 MILLIGRAM(S): 5 TABLET ORAL at 02:15

## 2021-06-19 RX ADMIN — Medication 30 MILLIGRAM(S): at 12:36

## 2021-06-19 RX ADMIN — Medication 975 MILLIGRAM(S): at 07:00

## 2021-06-19 RX ADMIN — Medication 30 MILLIGRAM(S): at 07:00

## 2021-06-19 NOTE — PROGRESS NOTE ADULT - SUBJECTIVE AND OBJECTIVE BOX
GYNECOLOGIC ONCOLOGY PROGRESS NOTE    POD#1    PROBLEMS:  fibroid uterus   ANEMIA   DIABETES MELLITUS  GERD   HTN     Pt seen and examined at bedside.     SUBJECTIVE:    Patient is without complaints.  Pain well-controlled with current pain medication regimen.   Flatus: none  Denies Nausea, Vomiting or Diarrhea.   Denies shortness of breath, chest pain or dyspnea on exertion.  Tolerating liquids overnight.   Denies fevers, chills, malaise, fatigue, and myalgia.     OBJECTIVE:     VITALS:  T(F): 98.2 (06-19-21 @ 05:00), Max: 98.8 (06-18-21 @ 18:30)  HR: 77 (06-19-21 @ 05:00) (54 - 80)  BP: 118/82 (06-19-21 @ 05:00) (118/82 - 160/76)  RR: 18 (06-19-21 @ 05:00) (12 - 18)  SpO2: 96% (06-19-21 @ 05:00) (95% - 100%)    I&O's Summary    18 Jun 2021 07:01  -  19 Jun 2021 05:39  --------------------------------------------------------  IN: 0 mL / OUT: 1325 mL / NET: -1325 mL    MEDICATIONS  (STANDING):  acetaminophen   Tablet .. 975 milliGRAM(s) Oral every 6 hours  amLODIPine   Tablet 10 milliGRAM(s) Oral daily  cloNIDine 0.1 milliGRAM(s) Oral two times a day  dextrose 40% Gel 15 Gram(s) Oral once  dextrose 5%. 1000 milliLiter(s) (50 mL/Hr) IV Continuous <Continuous>  dextrose 5%. 1000 milliLiter(s) (100 mL/Hr) IV Continuous <Continuous>  dextrose 50% Injectable 25 Gram(s) IV Push once  dextrose 50% Injectable 12.5 Gram(s) IV Push once  dextrose 50% Injectable 25 Gram(s) IV Push once  enoxaparin Injectable 40 milliGRAM(s) SubCutaneous daily  glucagon  Injectable 1 milliGRAM(s) IntraMuscular once  insulin lispro (ADMELOG) corrective regimen sliding scale   SubCutaneous three times a day before meals  insulin lispro (ADMELOG) corrective regimen sliding scale   SubCutaneous at bedtime  ketorolac   Injectable 30 milliGRAM(s) IV Push every 6 hours  lactated ringers. 1000 milliLiter(s) (125 mL/Hr) IV Continuous <Continuous>  metoprolol succinate ER 50 milliGRAM(s) Oral daily    MEDICATIONS  (PRN):  acetaminophen   Tablet .. 1000 milliGRAM(s) Oral every 6 hours PRN Mild Pain (1 - 3)  HYDROmorphone  Injectable 0.2 milliGRAM(s) IV Push every 2 hours PRN Severe Pain (7 - 10)  ondansetron    Tablet 8 milliGRAM(s) Oral every 8 hours PRN Nausea and/or Vomiting  ondansetron Injectable 4 milliGRAM(s) IV Push every 6 hours PRN Nausea and/or Vomiting  oxyCODONE    IR 5 milliGRAM(s) Oral every 3 hours PRN Moderate Pain (4 - 6)  oxyCODONE    IR 10 milliGRAM(s) Oral every 4 hours PRN Severe Pain (7 - 10)  simethicone 80 milliGRAM(s) Chew every 6 hours PRN Gas    Physical Exam:  Constitutional: NAD  Pulmonary: clear to auscultation bilaterally   Cardiovascular: Regular rate and rhythm   Abdomen: soft, appropriately tender to palpation, non-distended, normal bowel sounds  Extremities: no lower extremity edema or calve tenderness, Patricia's sign negative.  Incision: PREVENA dressing in place, clean, intact, no draining or bleeding. Without signs of infection or hernia.    LABS:  AM labs pending                RADIOLOGY & ADDITIONAL TESTS:

## 2021-06-19 NOTE — PROGRESS NOTE ADULT - ASSESSMENT
46y POD#1 s/p STARR, BS, right ovarian cystectomy for symptomatic fibroid uterus, HD#2.     Neuro: A&O x3, pain well controlled on current pain medication regimen   CV: BP and HR WNL, on home antihypertensives to start today   Pulm: without respiratory complaints, O2 sats WNL on RA, encouraged incentive spirometry   GI/Nutrition: regular diet, to advance as tolerated, currently tolerating clears without standing nausea or vomiting   /Renal: with Ramirez in place, to be removed this AM, trial of void pending, UOP adequate   ID: no infectious concerns at this time, s/p Ancef and Flagyl intraoperatively   Lines/Tubes: peripheral IV, ramirez   Endo: diabetic, ISS ordered, elevated blood sugar readings last night 194 and 193, will wait for fasting value today and manage as needed   Skin: PREVENA dressing in place, no concerns at this time   Proph: Lovenox daily and SCD's while not ambulating  for DVT ppx  Dispo: continue inpatient management and care, will discuss dispo on POD#3

## 2021-06-20 ENCOUNTER — APPOINTMENT (OUTPATIENT)
Dept: DISASTER EMERGENCY | Facility: CLINIC | Age: 46
End: 2021-06-20

## 2021-06-20 LAB
GLUCOSE BLDC GLUCOMTR-MCNC: 145 MG/DL — HIGH (ref 70–99)
GLUCOSE BLDC GLUCOMTR-MCNC: 149 MG/DL — HIGH (ref 70–99)
GLUCOSE BLDC GLUCOMTR-MCNC: 152 MG/DL — HIGH (ref 70–99)
GLUCOSE BLDC GLUCOMTR-MCNC: 171 MG/DL — HIGH (ref 70–99)

## 2021-06-20 RX ADMIN — SIMETHICONE 80 MILLIGRAM(S): 80 TABLET, CHEWABLE ORAL at 09:07

## 2021-06-20 RX ADMIN — Medication 30 MILLIGRAM(S): at 06:24

## 2021-06-20 RX ADMIN — Medication 975 MILLIGRAM(S): at 17:50

## 2021-06-20 RX ADMIN — Medication 0.1 MILLIGRAM(S): at 06:23

## 2021-06-20 RX ADMIN — Medication 975 MILLIGRAM(S): at 06:24

## 2021-06-20 RX ADMIN — Medication 975 MILLIGRAM(S): at 17:51

## 2021-06-20 RX ADMIN — Medication 30 MILLIGRAM(S): at 00:00

## 2021-06-20 RX ADMIN — SIMETHICONE 80 MILLIGRAM(S): 80 TABLET, CHEWABLE ORAL at 20:58

## 2021-06-20 RX ADMIN — OXYCODONE HYDROCHLORIDE 10 MILLIGRAM(S): 5 TABLET ORAL at 23:05

## 2021-06-20 RX ADMIN — AMLODIPINE BESYLATE 10 MILLIGRAM(S): 2.5 TABLET ORAL at 06:23

## 2021-06-20 RX ADMIN — OXYCODONE HYDROCHLORIDE 10 MILLIGRAM(S): 5 TABLET ORAL at 13:34

## 2021-06-20 RX ADMIN — Medication 1: at 16:55

## 2021-06-20 RX ADMIN — ENOXAPARIN SODIUM 40 MILLIGRAM(S): 100 INJECTION SUBCUTANEOUS at 13:26

## 2021-06-20 RX ADMIN — Medication 975 MILLIGRAM(S): at 23:09

## 2021-06-20 RX ADMIN — Medication 975 MILLIGRAM(S): at 13:25

## 2021-06-20 RX ADMIN — OXYCODONE HYDROCHLORIDE 10 MILLIGRAM(S): 5 TABLET ORAL at 09:06

## 2021-06-20 RX ADMIN — Medication 975 MILLIGRAM(S): at 06:23

## 2021-06-20 RX ADMIN — Medication 975 MILLIGRAM(S): at 23:45

## 2021-06-20 RX ADMIN — OXYCODONE HYDROCHLORIDE 10 MILLIGRAM(S): 5 TABLET ORAL at 09:17

## 2021-06-20 RX ADMIN — Medication 975 MILLIGRAM(S): at 13:26

## 2021-06-20 RX ADMIN — Medication 30 MILLIGRAM(S): at 06:23

## 2021-06-20 RX ADMIN — OXYCODONE HYDROCHLORIDE 10 MILLIGRAM(S): 5 TABLET ORAL at 22:05

## 2021-06-20 RX ADMIN — Medication 975 MILLIGRAM(S): at 00:00

## 2021-06-20 RX ADMIN — Medication 1: at 13:24

## 2021-06-20 RX ADMIN — Medication 50 MILLIGRAM(S): at 06:23

## 2021-06-20 NOTE — PROGRESS NOTE ADULT - SUBJECTIVE AND OBJECTIVE BOX
Name: CATA LAMAR  MRN: [id number]  Date Admitted: 06-18-21 (2d)  Location: [place] [address]  Attending: Admitting: Katelynn Betancourt    Gynecology-Oncology Progress Note    CATA LAMAR is a 46y s/p STARR, BS, right ovarian cystectomy, POD #2, HD #3    SUBJECTIVE:  Pt seen and examined at bedside.   Patient is without complaints.  Pain poorly controlled.   Tolerating PO intake without N/V. Denies flatus or BM.   OOB and ambulating without difficulty or dyspnea.  Denies HA, dizziness, fevers, chills, CP, or SOB.    OBJECTIVE:   T(F): 98.9 (06-20-21 @ 05:00), Max: 98.9 (06-20-21 @ 05:00)  HR: 76 (06-20-21 @ 05:00) (69 - 79)  BP: 148/94 (06-20-21 @ 05:00) (127/83 - 148/94)  RR: 18 (06-20-21 @ 05:00) (18 - 18)  SpO2: 95% (06-20-21 @ 05:00) (95% - 96%)    06-18-21 @ 07:01  -  06-19-21 @ 07:00  --------------------------------------------------------  IN:  Total IN: 0 mL    OUT:    Indwelling Catheter - Urethral (mL): 1325 mL    Voided (mL): 400 mL  Total OUT: 1725 mL    Total NET: -1725 mL      06-19-21 @ 07:01  -  06-20-21 @ 05:56  --------------------------------------------------------  IN:  Total IN: 0 mL    OUT:    Voided (mL): 2275 mL  Total OUT: 2275 mL    Total NET: -2275 mL    Physical Exam:  Constitutional: NAD, AOx3  Pulmonary: clear to auscultation bilaterally  Cardiovascular: Regular rate and rhythm   Abdomen: soft, non-tender, non-distended, normal bowel sounds  Extremities: no lower extremity edema or calve tenderness, Patricia's sign negative. SCDs.  Incision: Prevena in place with good vacuum    LABS:                        12.0   12.00 )-----------( 201      ( 19 Jun 2021 06:51 )             36.8             RADIOLOGY & ADDITIONAL TESTS:    HOSPITAL MEDS:  MEDICATIONS  (STANDING):  acetaminophen   Tablet .. 975 milliGRAM(s) Oral every 6 hours  amLODIPine   Tablet 10 milliGRAM(s) Oral daily  cloNIDine 0.1 milliGRAM(s) Oral two times a day  dextrose 40% Gel 15 Gram(s) Oral once  dextrose 5%. 1000 milliLiter(s) (50 mL/Hr) IV Continuous <Continuous>  dextrose 5%. 1000 milliLiter(s) (100 mL/Hr) IV Continuous <Continuous>  dextrose 50% Injectable 25 Gram(s) IV Push once  dextrose 50% Injectable 12.5 Gram(s) IV Push once  dextrose 50% Injectable 25 Gram(s) IV Push once  enoxaparin Injectable 40 milliGRAM(s) SubCutaneous daily  glucagon  Injectable 1 milliGRAM(s) IntraMuscular once  insulin lispro (ADMELOG) corrective regimen sliding scale   SubCutaneous three times a day before meals  insulin lispro (ADMELOG) corrective regimen sliding scale   SubCutaneous at bedtime  ketorolac   Injectable 30 milliGRAM(s) IV Push every 6 hours  lactated ringers. 1000 milliLiter(s) (125 mL/Hr) IV Continuous <Continuous>  metoprolol succinate ER 50 milliGRAM(s) Oral daily    MEDICATIONS  (PRN):  acetaminophen   Tablet .. 1000 milliGRAM(s) Oral every 6 hours PRN Mild Pain (1 - 3)  HYDROmorphone  Injectable 0.2 milliGRAM(s) IV Push every 2 hours PRN Severe Pain (7 - 10)  ondansetron    Tablet 8 milliGRAM(s) Oral every 8 hours PRN Nausea and/or Vomiting  ondansetron Injectable 4 milliGRAM(s) IV Push every 6 hours PRN Nausea and/or Vomiting  oxyCODONE    IR 10 milliGRAM(s) Oral every 4 hours PRN Severe Pain (7 - 10)  oxyCODONE    IR 5 milliGRAM(s) Oral every 3 hours PRN Moderate Pain (4 - 6)  simethicone 80 milliGRAM(s) Chew every 6 hours PRN Gas

## 2021-06-20 NOTE — PROGRESS NOTE ADULT - ASSESSMENT
46y s/p STARR, BS, right ovarian cystectomy, POD #2, HD #3.    Neuro: A&O x3, pain states pain poorly controlled, only took 1 oxy overnight. Advised to minimize time in bed and ambulate to chair.   CV: Hx of HTN, on home antihypertensives to start today   Pulm: without respiratory complaints, O2 sats WNL on RA, encouraged incentive spirometry   GI/Nutrition: tolerating regular diet, flatus pending. Advised to chew gum and to increase ambulation.   /Renal: Passed TOV. Requested external female catheter to minimize ambulation and discomfort.   ID: no infectious concerns at this time, s/p Ancef and Flagyl intraoperatively   Lines/Tubes: peripheral IV  Endo: diabetic, ISS ordered  Skin: PREVENA dressing in place, no concerns at this time   Proph: Lovenox daily and SCD's while not ambulating  for DVT ppx  Dispo: continue inpatient management and care, will discuss dispo on POD#3

## 2021-06-20 NOTE — PATIENT PROFILE ADULT - NSPROPTRIGHTSUPPORTPERSON_GEN_A_NUR
2020    TELEHEALTH EVALUATION -- Audio/Visual (During AOBNU-64 public health emergency)    HPI:    Bakari Belcher (:  1941) has requested a phone evaluation for the following concern(s):    Phone visit due to difficulty with technology. She states she has been having intermittent diarrhea for the past 6 weeks. At our visit last week it seemed to have resolved so she did not bring it up. However, it has returned. Reports a bowel movement 4-5 times per day. It is very watery. She has lost a few pounds. Sometimes wakes her up at night. She denies fever or chills. She denies blood in the stool. She denies abdominal pain. Remote history of C. difficile. She thinks this is different. She has not had a colonoscopy in the past 10 years. Review of Systems   above    Prior to Visit Medications    Medication Sig Taking?  Authorizing Provider   QUEtiapine (SEROQUEL) 50 MG tablet Take 50 mg by mouth as needed Yes Historical Provider, MD   cyanocobalamin 1000 MCG tablet Take 1 tablet by mouth daily Yes Maylin Gum, MD   atorvastatin (LIPITOR) 20 MG tablet Take 1 tablet by mouth daily Yes Maylin Gum, MD   levothyroxine (SYNTHROID) 50 MCG tablet Take 50 mcg by mouth daily Yes Historical Provider, MD   DULoxetine (CYMBALTA) 30 MG extended release capsule Take 30 mg by mouth daily Yes Historical Provider, MD   lamoTRIgine (LAMICTAL) 200 MG tablet Take 400 mg by mouth nightly Yes Historical Provider, MD   albuterol sulfate  (90 Base) MCG/ACT inhaler Inhale 2 puffs into the lungs every 6 hours as needed for Wheezing  Delmis Krueger MD       Social History     Tobacco Use    Smoking status: Former Smoker     Years: 20.00     Types: Cigarettes    Smokeless tobacco: Never Used    Tobacco comment: 1 pack / wk   Substance Use Topics    Alcohol use: Yes     Frequency: Monthly or less    Drug use: Not on file        Past Medical History:   Diagnosis Date    Balance disorder     Bipolar disorder ([facility] [address]      Health NP Psych    C. difficile colitis     remote, hospitalized    Depression     Hypothyroidism     Insomnia     Memory loss     see 2019 brain MRI    Mild asthma     Osteopenia 07/23/2020    dexa    Sleep apnea        PHYSICAL EXAMINATION:  Limited due to phone visit    ASSESSMENT/PLAN:  1. Diarrhea of presumed infectious origin  Unclear etiology. Patient thinks she may have IBS. Advised her this would be a diagnosis of exclusion so we should check for other causes first.  Advised stool studies.  - Fecal Leukocytes; Future  - C DIFF TOXIN/ANTIGEN; Future her  had been in and out of the hospital    2. Diarrhea, unspecified type  If above negative and symptoms persist, advised colonoscopy. Recommended bland brat diet  - AFL - Cody Madera MD, Gastroenterology, [place]      Return if symptoms worsen or fail to improve. Sadi Seth is a 70 y.o. female being evaluated by a Virtual Visit (video visit) encounter to address concerns as mentioned above. A caregiver was present when appropriate. Due to this being a TeleHealth encounter (During OOGUK-04 public health emergency), evaluation of the following organ systems was limited: Vitals/Constitutional/EENT/Resp/CV/GI//MS/Neuro/Skin/Heme-Lymph-Imm. Pursuant to the emergency declaration under the [address] [place] [address], [address] [place] [address] authority and the [employer] Act, this Virtual Visit was conducted with patient's (and/or legal guardian's) consent, to reduce the patient's risk of exposure to COVID-19 and provide necessary medical care. The patient (and/or legal guardian) has also been advised to contact this office for worsening conditions or problems, and seek emergency medical treatment and/or call 911 if deemed necessary.      Patient identification was verified at the start of the visit: {YES    Total time spent on this encounter: {Time Spent:15 min    Services were provided through a phone synchronous discussion virtually to substitute for in-person clinic visit. Patient and provider were located at their individual homes. --Skylar Nowak MD on 9/1/2020 at 11:19 PM    An electronic signature was used to authenticate this note. declines

## 2021-06-21 LAB
ANION GAP SERPL CALC-SCNC: 11 MMOL/L — SIGNIFICANT CHANGE UP (ref 5–17)
BASOPHILS # BLD AUTO: 0.03 K/UL — SIGNIFICANT CHANGE UP (ref 0–0.2)
BASOPHILS NFR BLD AUTO: 0.3 % — SIGNIFICANT CHANGE UP (ref 0–2)
BUN SERPL-MCNC: 6.8 MG/DL — LOW (ref 8–20)
CALCIUM SERPL-MCNC: 8.8 MG/DL — SIGNIFICANT CHANGE UP (ref 8.6–10.2)
CHLORIDE SERPL-SCNC: 104 MMOL/L — SIGNIFICANT CHANGE UP (ref 98–107)
CO2 SERPL-SCNC: 23 MMOL/L — SIGNIFICANT CHANGE UP (ref 22–29)
CREAT SERPL-MCNC: 0.81 MG/DL — SIGNIFICANT CHANGE UP (ref 0.5–1.3)
EOSINOPHIL # BLD AUTO: 0.12 K/UL — SIGNIFICANT CHANGE UP (ref 0–0.5)
EOSINOPHIL NFR BLD AUTO: 1.3 % — SIGNIFICANT CHANGE UP (ref 0–6)
GLUCOSE BLDC GLUCOMTR-MCNC: 141 MG/DL — HIGH (ref 70–99)
GLUCOSE BLDC GLUCOMTR-MCNC: 161 MG/DL — HIGH (ref 70–99)
GLUCOSE BLDC GLUCOMTR-MCNC: 163 MG/DL — HIGH (ref 70–99)
GLUCOSE BLDC GLUCOMTR-MCNC: 174 MG/DL — HIGH (ref 70–99)
GLUCOSE SERPL-MCNC: 146 MG/DL — HIGH (ref 70–99)
HCT VFR BLD CALC: 36.2 % — SIGNIFICANT CHANGE UP (ref 34.5–45)
HGB BLD-MCNC: 11.4 G/DL — LOW (ref 11.5–15.5)
IMM GRANULOCYTES NFR BLD AUTO: 0.3 % — SIGNIFICANT CHANGE UP (ref 0–1.5)
LYMPHOCYTES # BLD AUTO: 3.15 K/UL — SIGNIFICANT CHANGE UP (ref 1–3.3)
LYMPHOCYTES # BLD AUTO: 34.3 % — SIGNIFICANT CHANGE UP (ref 13–44)
MAGNESIUM SERPL-MCNC: 1.8 MG/DL — SIGNIFICANT CHANGE UP (ref 1.6–2.6)
MCHC RBC-ENTMCNC: 27.7 PG — SIGNIFICANT CHANGE UP (ref 27–34)
MCHC RBC-ENTMCNC: 31.5 GM/DL — LOW (ref 32–36)
MCV RBC AUTO: 87.9 FL — SIGNIFICANT CHANGE UP (ref 80–100)
MONOCYTES # BLD AUTO: 0.6 K/UL — SIGNIFICANT CHANGE UP (ref 0–0.9)
MONOCYTES NFR BLD AUTO: 6.5 % — SIGNIFICANT CHANGE UP (ref 2–14)
NEUTROPHILS # BLD AUTO: 5.26 K/UL — SIGNIFICANT CHANGE UP (ref 1.8–7.4)
NEUTROPHILS NFR BLD AUTO: 57.3 % — SIGNIFICANT CHANGE UP (ref 43–77)
PHOSPHATE SERPL-MCNC: 2.9 MG/DL — SIGNIFICANT CHANGE UP (ref 2.4–4.7)
PLATELET # BLD AUTO: 299 K/UL — SIGNIFICANT CHANGE UP (ref 150–400)
POTASSIUM SERPL-MCNC: 3.6 MMOL/L — SIGNIFICANT CHANGE UP (ref 3.5–5.3)
POTASSIUM SERPL-SCNC: 3.6 MMOL/L — SIGNIFICANT CHANGE UP (ref 3.5–5.3)
RBC # BLD: 4.12 M/UL — SIGNIFICANT CHANGE UP (ref 3.8–5.2)
RBC # FLD: 13.5 % — SIGNIFICANT CHANGE UP (ref 10.3–14.5)
SODIUM SERPL-SCNC: 138 MMOL/L — SIGNIFICANT CHANGE UP (ref 135–145)
WBC # BLD: 9.19 K/UL — SIGNIFICANT CHANGE UP (ref 3.8–10.5)
WBC # FLD AUTO: 9.19 K/UL — SIGNIFICANT CHANGE UP (ref 3.8–10.5)

## 2021-06-21 RX ORDER — POLYETHYLENE GLYCOL 3350 17 G/17G
17 POWDER, FOR SOLUTION ORAL ONCE
Refills: 0 | Status: COMPLETED | OUTPATIENT
Start: 2021-06-21 | End: 2021-06-21

## 2021-06-21 RX ORDER — SENNA PLUS 8.6 MG/1
2 TABLET ORAL AT BEDTIME
Refills: 0 | Status: DISCONTINUED | OUTPATIENT
Start: 2021-06-21 | End: 2021-06-23

## 2021-06-21 RX ADMIN — OXYCODONE HYDROCHLORIDE 10 MILLIGRAM(S): 5 TABLET ORAL at 14:00

## 2021-06-21 RX ADMIN — AMLODIPINE BESYLATE 10 MILLIGRAM(S): 2.5 TABLET ORAL at 05:51

## 2021-06-21 RX ADMIN — OXYCODONE HYDROCHLORIDE 10 MILLIGRAM(S): 5 TABLET ORAL at 13:28

## 2021-06-21 RX ADMIN — Medication 975 MILLIGRAM(S): at 05:52

## 2021-06-21 RX ADMIN — OXYCODONE HYDROCHLORIDE 10 MILLIGRAM(S): 5 TABLET ORAL at 04:17

## 2021-06-21 RX ADMIN — Medication 50 MILLIGRAM(S): at 05:51

## 2021-06-21 RX ADMIN — Medication 0.1 MILLIGRAM(S): at 05:51

## 2021-06-21 RX ADMIN — Medication 975 MILLIGRAM(S): at 13:25

## 2021-06-21 RX ADMIN — SENNA PLUS 2 TABLET(S): 8.6 TABLET ORAL at 21:20

## 2021-06-21 RX ADMIN — Medication 975 MILLIGRAM(S): at 23:18

## 2021-06-21 RX ADMIN — Medication 0.1 MILLIGRAM(S): at 17:10

## 2021-06-21 RX ADMIN — Medication 975 MILLIGRAM(S): at 06:13

## 2021-06-21 RX ADMIN — SIMETHICONE 80 MILLIGRAM(S): 80 TABLET, CHEWABLE ORAL at 21:21

## 2021-06-21 RX ADMIN — Medication 975 MILLIGRAM(S): at 17:09

## 2021-06-21 RX ADMIN — Medication 1: at 13:22

## 2021-06-21 RX ADMIN — ENOXAPARIN SODIUM 40 MILLIGRAM(S): 100 INJECTION SUBCUTANEOUS at 13:24

## 2021-06-21 RX ADMIN — Medication 975 MILLIGRAM(S): at 17:17

## 2021-06-21 RX ADMIN — OXYCODONE HYDROCHLORIDE 10 MILLIGRAM(S): 5 TABLET ORAL at 22:20

## 2021-06-21 RX ADMIN — OXYCODONE HYDROCHLORIDE 5 MILLIGRAM(S): 5 TABLET ORAL at 10:23

## 2021-06-21 RX ADMIN — OXYCODONE HYDROCHLORIDE 10 MILLIGRAM(S): 5 TABLET ORAL at 21:20

## 2021-06-21 RX ADMIN — OXYCODONE HYDROCHLORIDE 5 MILLIGRAM(S): 5 TABLET ORAL at 09:32

## 2021-06-21 RX ADMIN — Medication 975 MILLIGRAM(S): at 13:28

## 2021-06-21 RX ADMIN — POLYETHYLENE GLYCOL 3350 17 GRAM(S): 17 POWDER, FOR SOLUTION ORAL at 05:52

## 2021-06-21 RX ADMIN — SIMETHICONE 80 MILLIGRAM(S): 80 TABLET, CHEWABLE ORAL at 13:24

## 2021-06-21 RX ADMIN — OXYCODONE HYDROCHLORIDE 10 MILLIGRAM(S): 5 TABLET ORAL at 05:17

## 2021-06-21 RX ADMIN — Medication 1: at 09:37

## 2021-06-21 NOTE — PROGRESS NOTE ADULT - ASSESSMENT
CATA LAMAR is a 45 yo s/p STARR, BS, right ovarian cystectomy, POD #3, HD #4    PLAN:  Neuro: A&O x3, C/w PRN pain medications. Advised to minimize time in bed and ambulate to chair.   CV: BP overnight: /84-91. Hx of HTN, on home antihypertensives to start today   Pulm: O2 sats WNL on RA, encouraged incentive spirometry   GI/Nutrition: Tolerating regular diet, flatus pending. Advised to chew gum and to increase ambulation.   /Renal: Urinating spontaneously.    ID: No infectious concerns at this time, s/p Ancef and Flagyl intraoperatively   Lines/Tubes: peripheral IV  Endo: Hx of DM. FS overnight: 145-152, ISS ordered  Skin: PREVENA dressing in place, no concerns at this time   DVT PPX: Lovenox daily and SCD's while not ambulating  for DVT ppx  Dispo: Continue inpatient management and care, plan for discharge today CATA LAMAR is a 45 yo s/p STARR, BS, right ovarian cystectomy, POD #3, HD #4    PLAN:  Neuro: A&O x3, C/w PRN pain medications. Advised to minimize time in bed and ambulate to chair.   CV: BP overnight: 140-159/84-91. Hx of HTN, on home antihypertensives to start today   Pulm: O2 sats WNL on RA, encouraged incentive spirometry   GI/Nutrition: Tolerating 10% of regular diet. mildly distended. Advised to chew gum and to increase ambulation.   /Renal: Urinating spontaneously.    ID: No infectious concerns at this time, s/p Ancef and Flagyl intraoperatively   Lines/Tubes: peripheral IV  Endo: Hx of DM. FS overnight: 145-152, ISS ordered  Skin: PREVENA dressing in place, no concerns at this time   DVT PPX: Lovenox daily and SCD's while not ambulating  for DVT ppx  Dispo: Continue inpatient management and care, await more return of bowel function.

## 2021-06-21 NOTE — CHART NOTE - NSCHARTNOTEFT_GEN_A_CORE
S: Patient seen and examined at bedside.  Pain is moderately controlled, due for next pain medication in 30 mins.   Has not had anything to eat or drink yet, denies standing nausea or episodes of vomiting.  Not yet passing flatus    O:  T(C): 36.8 (06-18-21 @ 18:59), Max: 37.1 (06-18-21 @ 18:30)  HR: 58 (06-18-21 @ 18:59) (54 - 80)  BP: 149/89 (06-18-21 @ 18:59) (128/84 - 160/76)  RR: 18 (06-18-21 @ 18:59) (12 - 18)  SpO2: 95% (06-18-21 @ 18:59) (95% - 100%)    06-18-21 @ 07:01  -  06-18-21 @ 22:27  --------------------------------------------------------  IN: 0 mL / OUT: 1325 mL / NET: -1325 mL    PE:  General: NAD  Heart: RRR  Lungs: CTABL  Abdomen: soft, mild diffuse tenderness, +BS, PREVENA in place, intact, no bleeding or drainage   Ext: no calf pain    A/P: 46y s/p STARR, BS, right ovarian cystectomy for symptomatic fibroid uterus, in stable condition.   -AM labs pending   -Continue post operative care  -Continue current pain medication  -Regular diet, will advance as tolerated   -Encourage incentive spirometry and ambulation  -Will d/c ramirez in AM  -Will d/c IV fluids once tolerating PO and urinating
Patient seen and examined at bedside for PM rounds, she reports overall good pain control. Reports minimal appetite, however forced herself to finish 50% of her plate, reports minimal nausea during breakfast, however resolved without an episode of emesis. Reports minimal flatus.  Patient mainly in the chair since 5 am this morning, states that will ambulate with family member after lunch time.    Vital Signs Last 24 Hrs  T(C): 36.7 (21 Jun 2021 11:44), Max: 37.2 (20 Jun 2021 17:20)  T(F): 98 (21 Jun 2021 11:44), Max: 98.9 (20 Jun 2021 17:20)  HR: 73 (21 Jun 2021 11:44) (68 - 77)  BP: 117/82 (21 Jun 2021 11:44) (112/77 - 159/91)  RR: 18 (21 Jun 2021 11:44) (18 - 18)  SpO2: 93% (21 Jun 2021 11:44) (93% - 95%)    ambulation and incentive spirometry encouraged,  encouraged to PO hydrate, chew gum.  Patient on Simethicone for gas pain relief  Senna ordered on top of MiraLax for bowel movement  Will continue to monitor

## 2021-06-21 NOTE — PROGRESS NOTE ADULT - SUBJECTIVE AND OBJECTIVE BOX
GYNECOLOGIC ONCOLOGY PROGRESS NOTE      [facility] [address] [address]    CATA LAMAR is a 46y s/p STARR, BS, right ovarian cystectomy, POD #3, HD #4    PROBLEMS:  HTN  DIABETES  FIBROIDS  ANEMIA      SUBJECTIVE:  Pt seen and examined at bedside.   Patient is without complaints.  Pain well-controlled.  Flatus:  Denies Nausea, Vomiting or Diarrhea.  Denies shortness of breath, chest pain or dyspnea on exertion.  Tolerating diet.    OBJECTIVE:     VITALS:  T(F): 98.8 (06-21-21 @ 04:27), Max: 98.9 (06-20-21 @ 17:20)  HR: 71 (06-21-21 @ 04:27) (68 - 77)  BP: 159/91 (06-21-21 @ 04:27) (112/77 - 159/91)  RR: 18 (06-21-21 @ 04:27) (18 - 18)  SpO2: 95% (06-21-21 @ 04:27) (94% - 95%)  Wt(kg): --    I&O's Summary    19 Jun 2021 07:01  -  20 Jun 2021 07:00  --------------------------------------------------------  IN: 0 mL / OUT: 2275 mL / NET: -2275 mL    20 Jun 2021 07:01  -  21 Jun 2021 06:40  --------------------------------------------------------  IN: 1375 mL / OUT: 1000 mL / NET: 375 mL        MEDICATIONS  (STANDING):  acetaminophen   Tablet .. 975 milliGRAM(s) Oral every 6 hours  amLODIPine   Tablet 10 milliGRAM(s) Oral daily  cloNIDine 0.1 milliGRAM(s) Oral two times a day  dextrose 40% Gel 15 Gram(s) Oral once  dextrose 5%. 1000 milliLiter(s) (50 mL/Hr) IV Continuous <Continuous>  dextrose 5%. 1000 milliLiter(s) (100 mL/Hr) IV Continuous <Continuous>  dextrose 50% Injectable 25 Gram(s) IV Push once  dextrose 50% Injectable 12.5 Gram(s) IV Push once  dextrose 50% Injectable 25 Gram(s) IV Push once  enoxaparin Injectable 40 milliGRAM(s) SubCutaneous daily  glucagon  Injectable 1 milliGRAM(s) IntraMuscular once  insulin lispro (ADMELOG) corrective regimen sliding scale   SubCutaneous three times a day before meals  insulin lispro (ADMELOG) corrective regimen sliding scale   SubCutaneous at bedtime  lactated ringers. 1000 milliLiter(s) (125 mL/Hr) IV Continuous <Continuous>  metoprolol succinate ER 50 milliGRAM(s) Oral daily    MEDICATIONS  (PRN):  acetaminophen   Tablet .. 1000 milliGRAM(s) Oral every 6 hours PRN Mild Pain (1 - 3)  HYDROmorphone  Injectable 0.2 milliGRAM(s) IV Push every 2 hours PRN Severe Pain (7 - 10)  ondansetron    Tablet 8 milliGRAM(s) Oral every 8 hours PRN Nausea and/or Vomiting  ondansetron Injectable 4 milliGRAM(s) IV Push every 6 hours PRN Nausea and/or Vomiting  oxyCODONE    IR 5 milliGRAM(s) Oral every 3 hours PRN Moderate Pain (4 - 6)  oxyCODONE    IR 10 milliGRAM(s) Oral every 4 hours PRN Severe Pain (7 - 10)  simethicone 80 milliGRAM(s) Chew every 6 hours PRN Gas      Physical Exam:  Constitutional: NAD, AOx3  Pulmonary: clear to auscultation bilaterally  Cardiovascular: Regular rate and rhythm   Abdomen: soft, non-tender, non-distended, normal bowel sounds  Extremities: no lower extremity edema or calve tenderness, Patricia's sign negative. SCDs.  Incision: Prevena in place with good vacuum      LABS:                        11.4   9.19  )-----------( 299      ( 21 Jun 2021 06:19 )             36.2     06-19    135  |  105  |  5.3<L>  ----------------------------<  141<H>  3.6   |  19.0<L>  |  0.69    Ca    8.6      19 Jun 2021 06:51  Phos  3.7     06-19  Mg     2.0     06-19       GYNECOLOGIC ONCOLOGY PROGRESS NOTE      [facility] [address] [address]    CATA LAMAR is a 46y s/p STARR, BS, right ovarian cystectomy, POD #3, HD #4    PROBLEMS:  HTN  DIABETES  FIBROIDS  ANEMIA      SUBJECTIVE:  Pt seen and examined at bedside. Patient reports her pain is well controlled with current pain regimen. She reports not having a large appetite, only tolerating 10% of her diet. She denies nausea/vomiting. She has passed small amount of flatus, but has not yet had a bowel movement. She is voiding spontaneously. She reports ambulating in the hallway. She otherwise has not complaints.      OBJECTIVE:     VITALS:  T(F): 98.8 (06-21-21 @ 04:27), Max: 98.9 (06-20-21 @ 17:20)  HR: 71 (06-21-21 @ 04:27) (68 - 77)  BP: 159/91 (06-21-21 @ 04:27) (112/77 - 159/91)  RR: 18 (06-21-21 @ 04:27) (18 - 18)  SpO2: 95% (06-21-21 @ 04:27) (94% - 95%)  Wt(kg): --    I&O's Summary    19 Jun 2021 07:01  -  20 Jun 2021 07:00  --------------------------------------------------------  IN: 0 mL / OUT: 2275 mL / NET: -2275 mL    20 Jun 2021 07:01  -  21 Jun 2021 06:40  --------------------------------------------------------  IN: 1375 mL / OUT: 1000 mL / NET: 375 mL        MEDICATIONS  (STANDING):  acetaminophen   Tablet .. 975 milliGRAM(s) Oral every 6 hours  amLODIPine   Tablet 10 milliGRAM(s) Oral daily  cloNIDine 0.1 milliGRAM(s) Oral two times a day  dextrose 40% Gel 15 Gram(s) Oral once  dextrose 5%. 1000 milliLiter(s) (50 mL/Hr) IV Continuous <Continuous>  dextrose 5%. 1000 milliLiter(s) (100 mL/Hr) IV Continuous <Continuous>  dextrose 50% Injectable 25 Gram(s) IV Push once  dextrose 50% Injectable 12.5 Gram(s) IV Push once  dextrose 50% Injectable 25 Gram(s) IV Push once  enoxaparin Injectable 40 milliGRAM(s) SubCutaneous daily  glucagon  Injectable 1 milliGRAM(s) IntraMuscular once  insulin lispro (ADMELOG) corrective regimen sliding scale   SubCutaneous three times a day before meals  insulin lispro (ADMELOG) corrective regimen sliding scale   SubCutaneous at bedtime  lactated ringers. 1000 milliLiter(s) (125 mL/Hr) IV Continuous <Continuous>  metoprolol succinate ER 50 milliGRAM(s) Oral daily    MEDICATIONS  (PRN):  acetaminophen   Tablet .. 1000 milliGRAM(s) Oral every 6 hours PRN Mild Pain (1 - 3)  HYDROmorphone  Injectable 0.2 milliGRAM(s) IV Push every 2 hours PRN Severe Pain (7 - 10)  ondansetron    Tablet 8 milliGRAM(s) Oral every 8 hours PRN Nausea and/or Vomiting  ondansetron Injectable 4 milliGRAM(s) IV Push every 6 hours PRN Nausea and/or Vomiting  oxyCODONE    IR 5 milliGRAM(s) Oral every 3 hours PRN Moderate Pain (4 - 6)  oxyCODONE    IR 10 milliGRAM(s) Oral every 4 hours PRN Severe Pain (7 - 10)  simethicone 80 milliGRAM(s) Chew every 6 hours PRN Gas      Physical Exam:  Constitutional: NAD, AOx3  Pulmonary: nonlabored breeathing, 1000ml on IS  Cardiovascular: Regular rate and rhythm   Abdomen: soft, non-tender, mildly distended, normal bowel sounds, no rebound or guarding.  Extremities: SCDs on BLE  Incision: Prevena in place with good vacuum      LABS:                        11.4   9.19  )-----------( 299      ( 21 Jun 2021 06:19 )             36.2     06-19    135  |  105  |  5.3<L>  ----------------------------<  141<H>  3.6   |  19.0<L>  |  0.69    Ca    8.6      19 Jun 2021 06:51  Phos  3.7     06-19  Mg     2.0     06-19

## 2021-06-22 LAB
ANION GAP SERPL CALC-SCNC: 12 MMOL/L — SIGNIFICANT CHANGE UP (ref 5–17)
BASOPHILS # BLD AUTO: 0.03 K/UL — SIGNIFICANT CHANGE UP (ref 0–0.2)
BASOPHILS NFR BLD AUTO: 0.4 % — SIGNIFICANT CHANGE UP (ref 0–2)
BUN SERPL-MCNC: 8 MG/DL — SIGNIFICANT CHANGE UP (ref 8–20)
CALCIUM SERPL-MCNC: 8.7 MG/DL — SIGNIFICANT CHANGE UP (ref 8.6–10.2)
CHLORIDE SERPL-SCNC: 105 MMOL/L — SIGNIFICANT CHANGE UP (ref 98–107)
CO2 SERPL-SCNC: 23 MMOL/L — SIGNIFICANT CHANGE UP (ref 22–29)
CREAT SERPL-MCNC: 0.71 MG/DL — SIGNIFICANT CHANGE UP (ref 0.5–1.3)
EOSINOPHIL # BLD AUTO: 0.21 K/UL — SIGNIFICANT CHANGE UP (ref 0–0.5)
EOSINOPHIL NFR BLD AUTO: 2.8 % — SIGNIFICANT CHANGE UP (ref 0–6)
GLUCOSE BLDC GLUCOMTR-MCNC: 132 MG/DL — HIGH (ref 70–99)
GLUCOSE BLDC GLUCOMTR-MCNC: 139 MG/DL — HIGH (ref 70–99)
GLUCOSE BLDC GLUCOMTR-MCNC: 144 MG/DL — HIGH (ref 70–99)
GLUCOSE BLDC GLUCOMTR-MCNC: 183 MG/DL — HIGH (ref 70–99)
GLUCOSE BLDC GLUCOMTR-MCNC: 185 MG/DL — HIGH (ref 70–99)
GLUCOSE SERPL-MCNC: 143 MG/DL — HIGH (ref 70–99)
HCT VFR BLD CALC: 34.1 % — LOW (ref 34.5–45)
HGB BLD-MCNC: 11 G/DL — LOW (ref 11.5–15.5)
IMM GRANULOCYTES NFR BLD AUTO: 0.4 % — SIGNIFICANT CHANGE UP (ref 0–1.5)
LYMPHOCYTES # BLD AUTO: 2.7 K/UL — SIGNIFICANT CHANGE UP (ref 1–3.3)
LYMPHOCYTES # BLD AUTO: 35.8 % — SIGNIFICANT CHANGE UP (ref 13–44)
MAGNESIUM SERPL-MCNC: 1.8 MG/DL — SIGNIFICANT CHANGE UP (ref 1.6–2.6)
MCHC RBC-ENTMCNC: 27.8 PG — SIGNIFICANT CHANGE UP (ref 27–34)
MCHC RBC-ENTMCNC: 32.3 GM/DL — SIGNIFICANT CHANGE UP (ref 32–36)
MCV RBC AUTO: 86.1 FL — SIGNIFICANT CHANGE UP (ref 80–100)
MONOCYTES # BLD AUTO: 0.47 K/UL — SIGNIFICANT CHANGE UP (ref 0–0.9)
MONOCYTES NFR BLD AUTO: 6.2 % — SIGNIFICANT CHANGE UP (ref 2–14)
NEUTROPHILS # BLD AUTO: 4.1 K/UL — SIGNIFICANT CHANGE UP (ref 1.8–7.4)
NEUTROPHILS NFR BLD AUTO: 54.4 % — SIGNIFICANT CHANGE UP (ref 43–77)
PHOSPHATE SERPL-MCNC: 3.9 MG/DL — SIGNIFICANT CHANGE UP (ref 2.4–4.7)
PLATELET # BLD AUTO: 267 K/UL — SIGNIFICANT CHANGE UP (ref 150–400)
POTASSIUM SERPL-MCNC: 3.4 MMOL/L — LOW (ref 3.5–5.3)
POTASSIUM SERPL-SCNC: 3.4 MMOL/L — LOW (ref 3.5–5.3)
RBC # BLD: 3.96 M/UL — SIGNIFICANT CHANGE UP (ref 3.8–5.2)
RBC # FLD: 13.3 % — SIGNIFICANT CHANGE UP (ref 10.3–14.5)
SODIUM SERPL-SCNC: 139 MMOL/L — SIGNIFICANT CHANGE UP (ref 135–145)
SURGICAL PATHOLOGY STUDY: SIGNIFICANT CHANGE UP
WBC # BLD: 7.54 K/UL — SIGNIFICANT CHANGE UP (ref 3.8–10.5)
WBC # FLD AUTO: 7.54 K/UL — SIGNIFICANT CHANGE UP (ref 3.8–10.5)

## 2021-06-22 RX ORDER — POLYETHYLENE GLYCOL 3350 17 G/17G
17 POWDER, FOR SOLUTION ORAL DAILY
Refills: 0 | Status: DISCONTINUED | OUTPATIENT
Start: 2021-06-22 | End: 2021-06-23

## 2021-06-22 RX ADMIN — OXYCODONE HYDROCHLORIDE 5 MILLIGRAM(S): 5 TABLET ORAL at 13:00

## 2021-06-22 RX ADMIN — ENOXAPARIN SODIUM 40 MILLIGRAM(S): 100 INJECTION SUBCUTANEOUS at 12:11

## 2021-06-22 RX ADMIN — Medication 975 MILLIGRAM(S): at 12:10

## 2021-06-22 RX ADMIN — Medication 1: at 12:11

## 2021-06-22 RX ADMIN — Medication 975 MILLIGRAM(S): at 05:01

## 2021-06-22 RX ADMIN — Medication 0.1 MILLIGRAM(S): at 17:52

## 2021-06-22 RX ADMIN — SENNA PLUS 2 TABLET(S): 8.6 TABLET ORAL at 22:23

## 2021-06-22 RX ADMIN — OXYCODONE HYDROCHLORIDE 5 MILLIGRAM(S): 5 TABLET ORAL at 04:50

## 2021-06-22 RX ADMIN — OXYCODONE HYDROCHLORIDE 5 MILLIGRAM(S): 5 TABLET ORAL at 09:40

## 2021-06-22 RX ADMIN — Medication 1: at 08:44

## 2021-06-22 RX ADMIN — Medication 975 MILLIGRAM(S): at 23:48

## 2021-06-22 RX ADMIN — OXYCODONE HYDROCHLORIDE 5 MILLIGRAM(S): 5 TABLET ORAL at 23:15

## 2021-06-22 RX ADMIN — Medication 975 MILLIGRAM(S): at 17:52

## 2021-06-22 RX ADMIN — Medication 975 MILLIGRAM(S): at 18:34

## 2021-06-22 RX ADMIN — OXYCODONE HYDROCHLORIDE 5 MILLIGRAM(S): 5 TABLET ORAL at 17:52

## 2021-06-22 RX ADMIN — OXYCODONE HYDROCHLORIDE 5 MILLIGRAM(S): 5 TABLET ORAL at 08:49

## 2021-06-22 RX ADMIN — OXYCODONE HYDROCHLORIDE 5 MILLIGRAM(S): 5 TABLET ORAL at 05:50

## 2021-06-22 RX ADMIN — Medication 50 MILLIGRAM(S): at 05:01

## 2021-06-22 RX ADMIN — Medication 975 MILLIGRAM(S): at 00:18

## 2021-06-22 RX ADMIN — OXYCODONE HYDROCHLORIDE 5 MILLIGRAM(S): 5 TABLET ORAL at 12:10

## 2021-06-22 RX ADMIN — OXYCODONE HYDROCHLORIDE 5 MILLIGRAM(S): 5 TABLET ORAL at 22:49

## 2021-06-22 RX ADMIN — Medication 975 MILLIGRAM(S): at 06:01

## 2021-06-22 RX ADMIN — OXYCODONE HYDROCHLORIDE 5 MILLIGRAM(S): 5 TABLET ORAL at 18:34

## 2021-06-22 RX ADMIN — Medication 0.1 MILLIGRAM(S): at 05:01

## 2021-06-22 RX ADMIN — AMLODIPINE BESYLATE 10 MILLIGRAM(S): 2.5 TABLET ORAL at 05:01

## 2021-06-22 RX ADMIN — Medication 975 MILLIGRAM(S): at 13:00

## 2021-06-22 RX ADMIN — POLYETHYLENE GLYCOL 3350 17 GRAM(S): 17 POWDER, FOR SOLUTION ORAL at 08:44

## 2021-06-22 NOTE — PROGRESS NOTE ADULT - ASSESSMENT
CATA LAMAR is a 46y s/p STARR, BS, right ovarian cystectomy, POD #4, HD #5    PLAN:  Neuro: A&O x3, C/w PRN pain medications. Advised to minimize time in bed and ambulate to chair.   CV: BP overnight: 124-146/83-91. Hx of HTN, on home antihypertensives to start today   Pulm: O2 sats WNL on RA, encouraged incentive spirometry   GI/Nutrition: Tolerating 50% of regular diet. mildly distended. Advised to chew gum and to increase ambulation. Senna and miralax added to bowel regimen  /Renal: Urinating spontaneously.    ID: No infectious concerns at this time, s/p Ancef and Flagyl intraoperatively   Lines/Tubes: peripheral IV  Endo: Hx of DM. FS overnight: 141-174, ISS ordered  Skin: PREVENA dressing in place, no concerns at this time   DVT PPX: Lovenox daily and SCD's while not ambulating  for DVT ppx  Dispo: Continue inpatient management and care, await more return of bowel function.

## 2021-06-22 NOTE — PROGRESS NOTE ADULT - SUBJECTIVE AND OBJECTIVE BOX
GYNECOLOGIC ONCOLOGY PROGRESS NOTE      [facility] [address] [address]    CATA LAMAR is a 46y s/p STARR, BS, right ovarian cystectomy, POD #3, HD #5    PROBLEMS:  HTN  DIABETES  FIBROIDS  ANEMIA          SUBJECTIVE:  Pt seen and examined at bedside.   Patient reporting improvement in abdominal distention.  Pain well-controlled.  Flatus: +, denies BM  She is tolerating 50% of her diet. Denies Nausea, Vomiting or Diarrhea.  Denies shortness of breath, chest pain or dyspnea on exertion.  Is ambulating with family member.       OBJECTIVE:     VITALS:  T(F): 98.3 (06-22-21 @ 04:22), Max: 98.3 (06-22-21 @ 04:22)  HR: 81 (06-22-21 @ 04:22) (67 - 81)  BP: 146/91 (06-22-21 @ 04:22) (117/82 - 146/91)  RR: 18 (06-22-21 @ 04:22) (18 - 18)  SpO2: 94% (06-22-21 @ 04:22) (93% - 94%)      I&O's Summary    21 Jun 2021 07:01  -  22 Jun 2021 07:00  --------------------------------------------------------  IN: 0 mL / OUT: 1500 mL / NET: -1500 mL        MEDICATIONS  (STANDING):  acetaminophen   Tablet .. 975 milliGRAM(s) Oral every 6 hours  amLODIPine   Tablet 10 milliGRAM(s) Oral daily  cloNIDine 0.1 milliGRAM(s) Oral two times a day  dextrose 40% Gel 15 Gram(s) Oral once  dextrose 5%. 1000 milliLiter(s) (50 mL/Hr) IV Continuous <Continuous>  dextrose 5%. 1000 milliLiter(s) (100 mL/Hr) IV Continuous <Continuous>  dextrose 50% Injectable 25 Gram(s) IV Push once  dextrose 50% Injectable 12.5 Gram(s) IV Push once  dextrose 50% Injectable 25 Gram(s) IV Push once  enoxaparin Injectable 40 milliGRAM(s) SubCutaneous daily  glucagon  Injectable 1 milliGRAM(s) IntraMuscular once  insulin lispro (ADMELOG) corrective regimen sliding scale   SubCutaneous three times a day before meals  insulin lispro (ADMELOG) corrective regimen sliding scale   SubCutaneous at bedtime  metoprolol succinate ER 50 milliGRAM(s) Oral daily  polyethylene glycol 3350 17 Gram(s) Oral daily  senna 2 Tablet(s) Oral at bedtime    MEDICATIONS  (PRN):  acetaminophen   Tablet .. 1000 milliGRAM(s) Oral every 6 hours PRN Mild Pain (1 - 3)  HYDROmorphone  Injectable 0.2 milliGRAM(s) IV Push every 2 hours PRN Severe Pain (7 - 10)  ondansetron    Tablet 8 milliGRAM(s) Oral every 8 hours PRN Nausea and/or Vomiting  ondansetron Injectable 4 milliGRAM(s) IV Push every 6 hours PRN Nausea and/or Vomiting  oxyCODONE    IR 5 milliGRAM(s) Oral every 3 hours PRN Moderate Pain (4 - 6)  oxyCODONE    IR 10 milliGRAM(s) Oral every 4 hours PRN Severe Pain (7 - 10)  simethicone 80 milliGRAM(s) Chew every 6 hours PRN Gas    Physical Exam:  Constitutional: NAD, AOx3  Pulmonary: nonlabored breeathing, 1000ml on IS  Cardiovascular: Regular rate and rhythm   Abdomen: soft, non-tender, mildly distended, normal bowel sounds, no rebound or guarding.  Extremities: SCDs on BLE  Incision: Prevena in place with good vacuum      LABS:                        11.0   7.54  )-----------( 267      ( 22 Jun 2021 06:21 )             34.1     06-22    139  |  105  |  8.0  ----------------------------<  143<H>  3.4<L>   |  23.0  |  0.71    Ca    8.7      22 Jun 2021 06:21  Phos  3.9     06-22  Mg     1.8     06-22

## 2021-06-22 NOTE — PROGRESS NOTE ADULT - SUBJECTIVE AND OBJECTIVE BOX
Patient seen for afternoon rounds. She is c/o gas pains, denies flatus today. Reports ambulating from bed to chair but has not yet ambulated the halls, I strongly encouraged her to ambulate this afternoon as well as continue with chewing gum. She will continue with senna and miralax daily. Pain well controlled. She is tolerating regular diet and voiding without difficulty. Labs reviewed and WNL, will continue to monitor. Plan for dc home in AM.

## 2021-06-23 ENCOUNTER — TRANSCRIPTION ENCOUNTER (OUTPATIENT)
Age: 46
End: 2021-06-23

## 2021-06-23 VITALS
TEMPERATURE: 99 F | SYSTOLIC BLOOD PRESSURE: 109 MMHG | HEART RATE: 82 BPM | OXYGEN SATURATION: 94 % | RESPIRATION RATE: 18 BRPM | DIASTOLIC BLOOD PRESSURE: 76 MMHG

## 2021-06-23 PROBLEM — D21.9 BENIGN NEOPLASM OF CONNECTIVE AND OTHER SOFT TISSUE, UNSPECIFIED: Chronic | Status: ACTIVE | Noted: 2021-06-09

## 2021-06-23 PROBLEM — D64.9 ANEMIA, UNSPECIFIED: Chronic | Status: ACTIVE | Noted: 2021-06-09

## 2021-06-23 LAB
ANION GAP SERPL CALC-SCNC: 12 MMOL/L — SIGNIFICANT CHANGE UP (ref 5–17)
BASOPHILS # BLD AUTO: 0.02 K/UL — SIGNIFICANT CHANGE UP (ref 0–0.2)
BASOPHILS NFR BLD AUTO: 0.3 % — SIGNIFICANT CHANGE UP (ref 0–2)
BUN SERPL-MCNC: 11.2 MG/DL — SIGNIFICANT CHANGE UP (ref 8–20)
CALCIUM SERPL-MCNC: 8.6 MG/DL — SIGNIFICANT CHANGE UP (ref 8.6–10.2)
CHLORIDE SERPL-SCNC: 103 MMOL/L — SIGNIFICANT CHANGE UP (ref 98–107)
CO2 SERPL-SCNC: 23 MMOL/L — SIGNIFICANT CHANGE UP (ref 22–29)
CREAT SERPL-MCNC: 0.76 MG/DL — SIGNIFICANT CHANGE UP (ref 0.5–1.3)
EOSINOPHIL # BLD AUTO: 0.19 K/UL — SIGNIFICANT CHANGE UP (ref 0–0.5)
EOSINOPHIL NFR BLD AUTO: 2.5 % — SIGNIFICANT CHANGE UP (ref 0–6)
GLUCOSE BLDC GLUCOMTR-MCNC: 162 MG/DL — HIGH (ref 70–99)
GLUCOSE SERPL-MCNC: 163 MG/DL — HIGH (ref 70–99)
HCT VFR BLD CALC: 33.9 % — LOW (ref 34.5–45)
HGB BLD-MCNC: 10.8 G/DL — LOW (ref 11.5–15.5)
IMM GRANULOCYTES NFR BLD AUTO: 0.3 % — SIGNIFICANT CHANGE UP (ref 0–1.5)
LYMPHOCYTES # BLD AUTO: 2.35 K/UL — SIGNIFICANT CHANGE UP (ref 1–3.3)
LYMPHOCYTES # BLD AUTO: 30.4 % — SIGNIFICANT CHANGE UP (ref 13–44)
MCHC RBC-ENTMCNC: 27.8 PG — SIGNIFICANT CHANGE UP (ref 27–34)
MCHC RBC-ENTMCNC: 31.9 GM/DL — LOW (ref 32–36)
MCV RBC AUTO: 87.1 FL — SIGNIFICANT CHANGE UP (ref 80–100)
MONOCYTES # BLD AUTO: 0.55 K/UL — SIGNIFICANT CHANGE UP (ref 0–0.9)
MONOCYTES NFR BLD AUTO: 7.1 % — SIGNIFICANT CHANGE UP (ref 2–14)
NEUTROPHILS # BLD AUTO: 4.6 K/UL — SIGNIFICANT CHANGE UP (ref 1.8–7.4)
NEUTROPHILS NFR BLD AUTO: 59.4 % — SIGNIFICANT CHANGE UP (ref 43–77)
PLATELET # BLD AUTO: 341 K/UL — SIGNIFICANT CHANGE UP (ref 150–400)
POTASSIUM SERPL-MCNC: 3.5 MMOL/L — SIGNIFICANT CHANGE UP (ref 3.5–5.3)
POTASSIUM SERPL-SCNC: 3.5 MMOL/L — SIGNIFICANT CHANGE UP (ref 3.5–5.3)
RBC # BLD: 3.89 M/UL — SIGNIFICANT CHANGE UP (ref 3.8–5.2)
RBC # FLD: 13.2 % — SIGNIFICANT CHANGE UP (ref 10.3–14.5)
SODIUM SERPL-SCNC: 138 MMOL/L — SIGNIFICANT CHANGE UP (ref 135–145)
WBC # BLD: 7.73 K/UL — SIGNIFICANT CHANGE UP (ref 3.8–10.5)
WBC # FLD AUTO: 7.73 K/UL — SIGNIFICANT CHANGE UP (ref 3.8–10.5)

## 2021-06-23 PROCEDURE — 83735 ASSAY OF MAGNESIUM: CPT

## 2021-06-23 PROCEDURE — 80048 BASIC METABOLIC PNL TOTAL CA: CPT

## 2021-06-23 PROCEDURE — 88307 TISSUE EXAM BY PATHOLOGIST: CPT

## 2021-06-23 PROCEDURE — 82962 GLUCOSE BLOOD TEST: CPT

## 2021-06-23 PROCEDURE — 88305 TISSUE EXAM BY PATHOLOGIST: CPT

## 2021-06-23 PROCEDURE — 84100 ASSAY OF PHOSPHORUS: CPT

## 2021-06-23 PROCEDURE — 85025 COMPLETE CBC W/AUTO DIFF WBC: CPT

## 2021-06-23 PROCEDURE — C9399: CPT

## 2021-06-23 PROCEDURE — 36415 COLL VENOUS BLD VENIPUNCTURE: CPT

## 2021-06-23 PROCEDURE — 86769 SARS-COV-2 COVID-19 ANTIBODY: CPT

## 2021-06-23 RX ADMIN — SIMETHICONE 80 MILLIGRAM(S): 80 TABLET, CHEWABLE ORAL at 05:37

## 2021-06-23 RX ADMIN — Medication 975 MILLIGRAM(S): at 11:02

## 2021-06-23 RX ADMIN — Medication 1: at 08:06

## 2021-06-23 RX ADMIN — Medication 0.1 MILLIGRAM(S): at 05:34

## 2021-06-23 RX ADMIN — Medication 975 MILLIGRAM(S): at 05:34

## 2021-06-23 RX ADMIN — OXYCODONE HYDROCHLORIDE 5 MILLIGRAM(S): 5 TABLET ORAL at 11:01

## 2021-06-23 RX ADMIN — Medication 50 MILLIGRAM(S): at 05:34

## 2021-06-23 RX ADMIN — OXYCODONE HYDROCHLORIDE 5 MILLIGRAM(S): 5 TABLET ORAL at 06:00

## 2021-06-23 RX ADMIN — OXYCODONE HYDROCHLORIDE 5 MILLIGRAM(S): 5 TABLET ORAL at 05:34

## 2021-06-23 RX ADMIN — OXYCODONE HYDROCHLORIDE 5 MILLIGRAM(S): 5 TABLET ORAL at 12:00

## 2021-06-23 RX ADMIN — Medication 975 MILLIGRAM(S): at 00:00

## 2021-06-23 RX ADMIN — AMLODIPINE BESYLATE 10 MILLIGRAM(S): 2.5 TABLET ORAL at 05:34

## 2021-06-23 NOTE — DISCHARGE NOTE NURSING/CASE MANAGEMENT/SOCIAL WORK - NSDCFUADDAPPT_GEN_ALL_CORE_FT
Follow-up with Dr. Betancourt two weeks from surgery to review pathology report and for full postop check with removal of staples.    May walk and climb stairs as often as youd like, no vigorous activity, do not lift anything greater than 10lbs, nothing per vagina x 6 weeks, do not drive while on pain medication.

## 2021-06-23 NOTE — PROGRESS NOTE ADULT - ASSESSMENT
CATA LAMAR is a 46y s/p STARR, BS, right ovarian cystectomy, POD #5, HD #6    PLAN:  Neuro: A&O x3, C/w PRN pain medications. Advised to minimize time in bed and ambulate to chair.   CV: BP well controlled overnight. Hx of HTN, on home antihypertensives to start today   Pulm: O2 sats WNL on RA, encouraged incentive spirometry   GI/Nutrition: Tolerating 50% of regular diet. mildly distended. Advised to chew gum and to increase ambulation. Senna and miralax added to bowel regimen  /Renal: Urinating spontaneously.    ID: No infectious concerns at this time, s/p Ancef and Flagyl intraoperatively   Lines/Tubes: peripheral IV  Endo: Hx of DM. FS overnight: 141-174, ISS ordered  Skin: PREVENA dressing in place, no concerns at this time   DVT PPX: Lovenox daily and SCD's while not ambulating  for DVT ppx  Dispo: Continue inpatient management and care, await more return of bowel function.       CATA LAMAR is a 46y s/p STARR, BS, right ovarian cystectomy, POD #5, HD #6    PLAN:  Neuro: A&O x3, C/w PRN pain medications. Advised to minimize time in bed and ambulate.   CV: BP well controlled overnight. Hx of HTN, on home antihypertensives to start today   Pulm: O2 sats WNL on RA, encouraged incentive spirometry   GI/Nutrition: Tolerating >50% of regular diet. mildly distended. Advised to chew gum and to increase ambulation. Senna and miralax added to bowel regimen  /Renal: Urinating spontaneously.    ID: No infectious concerns at this time, s/p Ancef and Flagyl intraoperatively   Lines/Tubes: peripheral IV  Endo: Hx of DM. FS overnight: 141-174, ISS ordered  Skin: PREVENA dressing in place, no concerns at this time   DVT PPX: Lovenox daily and SCD's while not ambulating  for DVT ppx  Dispo: Continue care and plan discharge home today.

## 2021-06-23 NOTE — DISCHARGE NOTE NURSING/CASE MANAGEMENT/SOCIAL WORK - PATIENT PORTAL LINK FT
You can access the FollowMyHealth Patient Portal offered by [facility] by registering at the following website: [email or web address]. By joining [employer]’s FollowMyHealth portal, you will also be able to view your health information using other applications (apps) compatible with our system.

## 2021-06-23 NOTE — PROGRESS NOTE ADULT - SUBJECTIVE AND OBJECTIVE BOX
GYNECOLOGIC ONCOLOGY PROGRESS NOTE      [facility] 2GUL [id number]    CATA LAMAR is a 46y s/p STARR, BS, right ovarian cystectomy, POD #4, HD #6    PROBLEMS:  HTN  DIABETES  FIBROIDS  ANEMIA          SUBJECTIVE:  Pt seen and examined at bedside.   Patient reporting improvement in abdominal distention.  Pain well-controlled.  Flatus: +, denies BM  She is tolerating 50% of her diet. Denies Nausea, Vomiting or Diarrhea.  Denies shortness of breath, chest pain or dyspnea on exertion.  Is ambulating with assistance.     OBJECTIVE:     VITALS:  T(F): 98.7 (06-22-21 @ 22:09), Max: 98.7 (06-22-21 @ 11:54)  HR: 85 (06-22-21 @ 22:09) (70 - 85)  BP: 123/87 (06-22-21 @ 22:09) (123/87 - 148/79)  RR: 18 (06-22-21 @ 22:09) (18 - 18)  SpO2: 95% (06-22-21 @ 22:09) (93% - 95%)  Wt(kg): --    I&O's Summary    21 Jun 2021 07:01  -  22 Jun 2021 07:00  --------------------------------------------------------  IN: 0 mL / OUT: 1500 mL / NET: -1500 mL        MEDICATIONS  (STANDING):  acetaminophen   Tablet .. 975 milliGRAM(s) Oral every 6 hours  amLODIPine   Tablet 10 milliGRAM(s) Oral daily  cloNIDine 0.1 milliGRAM(s) Oral two times a day  dextrose 40% Gel 15 Gram(s) Oral once  dextrose 5%. 1000 milliLiter(s) (50 mL/Hr) IV Continuous <Continuous>  dextrose 5%. 1000 milliLiter(s) (100 mL/Hr) IV Continuous <Continuous>  dextrose 50% Injectable 25 Gram(s) IV Push once  dextrose 50% Injectable 12.5 Gram(s) IV Push once  dextrose 50% Injectable 25 Gram(s) IV Push once  enoxaparin Injectable 40 milliGRAM(s) SubCutaneous daily  glucagon  Injectable 1 milliGRAM(s) IntraMuscular once  insulin lispro (ADMELOG) corrective regimen sliding scale   SubCutaneous three times a day before meals  insulin lispro (ADMELOG) corrective regimen sliding scale   SubCutaneous at bedtime  metoprolol succinate ER 50 milliGRAM(s) Oral daily  polyethylene glycol 3350 17 Gram(s) Oral daily  senna 2 Tablet(s) Oral at bedtime    MEDICATIONS  (PRN):  acetaminophen   Tablet .. 1000 milliGRAM(s) Oral every 6 hours PRN Mild Pain (1 - 3)  ondansetron    Tablet 8 milliGRAM(s) Oral every 8 hours PRN Nausea and/or Vomiting  ondansetron Injectable 4 milliGRAM(s) IV Push every 6 hours PRN Nausea and/or Vomiting  oxyCODONE    IR 5 milliGRAM(s) Oral every 3 hours PRN Moderate Pain (4 - 6)  oxyCODONE    IR 10 milliGRAM(s) Oral every 4 hours PRN Severe Pain (7 - 10)  simethicone 80 milliGRAM(s) Chew every 6 hours PRN Gas        Physical Exam:  Constitutional: NAD, AOx3  Pulmonary: CTA B/L  Cardiovascular: Regular rate and rhythm   Abdomen: soft, Appropriately tender, mildly distended, normal bowel sounds, no rebound or guarding.  Extremities: SCDs on BLE  Incision: Prevena in place with good vacuum      LABS:                        11.0   7.54  )-----------( 267      ( 22 Jun 2021 06:21 )             34.1     06-22    139  |  105  |  8.0  ----------------------------<  143<H>  3.4<L>   |  23.0  |  0.71    Ca    8.7      22 Jun 2021 06:21  Phos  3.9     06-22  Mg     1.8     06-22            RADIOLOGY & ADDITIONAL TESTS:      GYNECOLOGIC ONCOLOGY PROGRESS NOTE      [facility] 2GUL 2316 01    CATA LAMAR is a 46y s/p STARR, BS, right ovarian cystectomy, POD #4, HD #6    PROBLEMS:  HTN  DIABETES  FIBROIDS  ANEMIA          SUBJECTIVE:  Pt seen and examined at bedside.   Patient reporting improvement in abdominal distention.  Pain well-controlled.  Flatus: +, denies BM  She is tolerating 50% of her diet. Denies Nausea, Vomiting or Diarrhea.  Denies shortness of breath, chest pain or dyspnea on exertion.  Is ambulating with assistance.     OBJECTIVE:     VITALS:  T(F): 98.7 (06-22-21 @ 22:09), Max: 98.7 (06-22-21 @ 11:54)  HR: 85 (06-22-21 @ 22:09) (70 - 85)  BP: 123/87 (06-22-21 @ 22:09) (123/87 - 148/79)  RR: 18 (06-22-21 @ 22:09) (18 - 18)  SpO2: 95% (06-22-21 @ 22:09) (93% - 95%)  Wt(kg): --    I&O's Summary    21 Jun 2021 07:01  -  22 Jun 2021 07:00  --------------------------------------------------------  IN: 0 mL / OUT: 1500 mL / NET: -1500 mL        MEDICATIONS  (STANDING):  acetaminophen   Tablet .. 975 milliGRAM(s) Oral every 6 hours  amLODIPine   Tablet 10 milliGRAM(s) Oral daily  cloNIDine 0.1 milliGRAM(s) Oral two times a day  dextrose 40% Gel 15 Gram(s) Oral once  dextrose 5%. 1000 milliLiter(s) (50 mL/Hr) IV Continuous <Continuous>  dextrose 5%. 1000 milliLiter(s) (100 mL/Hr) IV Continuous <Continuous>  dextrose 50% Injectable 25 Gram(s) IV Push once  dextrose 50% Injectable 12.5 Gram(s) IV Push once  dextrose 50% Injectable 25 Gram(s) IV Push once  enoxaparin Injectable 40 milliGRAM(s) SubCutaneous daily  glucagon  Injectable 1 milliGRAM(s) IntraMuscular once  insulin lispro (ADMELOG) corrective regimen sliding scale   SubCutaneous three times a day before meals  insulin lispro (ADMELOG) corrective regimen sliding scale   SubCutaneous at bedtime  metoprolol succinate ER 50 milliGRAM(s) Oral daily  polyethylene glycol 3350 17 Gram(s) Oral daily  senna 2 Tablet(s) Oral at bedtime    MEDICATIONS  (PRN):  acetaminophen   Tablet .. 1000 milliGRAM(s) Oral every 6 hours PRN Mild Pain (1 - 3)  ondansetron    Tablet 8 milliGRAM(s) Oral every 8 hours PRN Nausea and/or Vomiting  ondansetron Injectable 4 milliGRAM(s) IV Push every 6 hours PRN Nausea and/or Vomiting  oxyCODONE    IR 5 milliGRAM(s) Oral every 3 hours PRN Moderate Pain (4 - 6)  oxyCODONE    IR 10 milliGRAM(s) Oral every 4 hours PRN Severe Pain (7 - 10)  simethicone 80 milliGRAM(s) Chew every 6 hours PRN Gas        Physical Exam:  Constitutional: NAD, AOx3  Pulmonary: CTA B/L  Cardiovascular: Regular rate and rhythm   Abdomen: soft, Appropriately tender, mildly distended, normal bowel sounds, no rebound or guarding.  Extremities: SCDs on BLE  Incision: Prevena in place with good vacuum      LABS:                        11.0   7.54  )-----------( 267      ( 22 Jun 2021 06:21 )             34.1     06-22    139  |  105  |  8.0  ----------------------------<  143<H>  3.4<L>   |  23.0  |  0.71    Ca    8.7      22 Jun 2021 06:21  Phos  3.9     06-22  Mg     1.8     06-22          GYNECOLOGIC ONCOLOGY PROGRESS NOTE      [facility] 2GUL 2316 01    CATA LAMAR is a 46y s/p STARR, BS, right ovarian cystectomy, POD #5, HD #6    PROBLEMS:  HTN  DIABETES  FIBROIDS  ANEMIA          SUBJECTIVE:  Pt seen and examined at bedside.   Patient reporting improvement in abdominal distention.  Pain well-controlled.  Flatus: +, denies BM  She is tolerating more than 50% of her diet. Denies Nausea, Vomiting or Diarrhea.  Denies shortness of breath, chest pain or dyspnea on exertion.  Is ambulating with assistance. Using incentive spirometer.    OBJECTIVE:     VITALS:  T(F): 98.7 (06-22-21 @ 22:09), Max: 98.7 (06-22-21 @ 11:54)  HR: 85 (06-22-21 @ 22:09) (70 - 85)  BP: 123/87 (06-22-21 @ 22:09) (123/87 - 148/79)  RR: 18 (06-22-21 @ 22:09) (18 - 18)  SpO2: 95% (06-22-21 @ 22:09) (93% - 95%)    I&O's Summary    21 Jun 2021 07:01  -  22 Jun 2021 07:00  --------------------------------------------------------  IN: 0 mL / OUT: 1500 mL / NET: -1500 mL        MEDICATIONS  (STANDING):  acetaminophen   Tablet .. 975 milliGRAM(s) Oral every 6 hours  amLODIPine   Tablet 10 milliGRAM(s) Oral daily  cloNIDine 0.1 milliGRAM(s) Oral two times a day  dextrose 40% Gel 15 Gram(s) Oral once  dextrose 5%. 1000 milliLiter(s) (50 mL/Hr) IV Continuous <Continuous>  dextrose 5%. 1000 milliLiter(s) (100 mL/Hr) IV Continuous <Continuous>  dextrose 50% Injectable 25 Gram(s) IV Push once  dextrose 50% Injectable 12.5 Gram(s) IV Push once  dextrose 50% Injectable 25 Gram(s) IV Push once  enoxaparin Injectable 40 milliGRAM(s) SubCutaneous daily  glucagon  Injectable 1 milliGRAM(s) IntraMuscular once  insulin lispro (ADMELOG) corrective regimen sliding scale   SubCutaneous three times a day before meals  insulin lispro (ADMELOG) corrective regimen sliding scale   SubCutaneous at bedtime  metoprolol succinate ER 50 milliGRAM(s) Oral daily  polyethylene glycol 3350 17 Gram(s) Oral daily  senna 2 Tablet(s) Oral at bedtime    MEDICATIONS  (PRN):  acetaminophen   Tablet .. 1000 milliGRAM(s) Oral every 6 hours PRN Mild Pain (1 - 3)  ondansetron    Tablet 8 milliGRAM(s) Oral every 8 hours PRN Nausea and/or Vomiting  ondansetron Injectable 4 milliGRAM(s) IV Push every 6 hours PRN Nausea and/or Vomiting  oxyCODONE    IR 5 milliGRAM(s) Oral every 3 hours PRN Moderate Pain (4 - 6)  oxyCODONE    IR 10 milliGRAM(s) Oral every 4 hours PRN Severe Pain (7 - 10)  simethicone 80 milliGRAM(s) Chew every 6 hours PRN Gas        Physical Exam:  Constitutional: NAD, AOx3  Pulmonary: CTA B/L  Cardiovascular: Regular rate and rhythm   Abdomen: soft, Appropriately tender, mildly distended, normal bowel sounds, no rebound or guarding.  Extremities: SCDs on BLE  Incision: Prevena in place with good vacuum      LABS:                        11.0   7.54  )-----------( 267      ( 22 Jun 2021 06:21 )             34.1     06-22    139  |  105  |  8.0  ----------------------------<  143<H>  3.4<L>   |  23.0  |  0.71    Ca    8.7      22 Jun 2021 06:21  Phos  3.9     06-22  Mg     1.8     06-22

## 2021-06-29 ENCOUNTER — APPOINTMENT (OUTPATIENT)
Dept: GYNECOLOGIC ONCOLOGY | Facility: CLINIC | Age: 46
End: 2021-06-29
Payer: COMMERCIAL

## 2021-06-29 VITALS — OXYGEN SATURATION: 98 % | HEART RATE: 97 BPM | DIASTOLIC BLOOD PRESSURE: 79 MMHG | SYSTOLIC BLOOD PRESSURE: 111 MMHG

## 2021-06-29 VITALS — WEIGHT: 227 LBS | BODY MASS INDEX: 35.63 KG/M2 | HEIGHT: 67 IN

## 2021-06-29 PROCEDURE — 99024 POSTOP FOLLOW-UP VISIT: CPT

## 2021-06-30 RX ORDER — OXYCODONE AND ACETAMINOPHEN 5; 325 MG/1; MG/1
5-325 TABLET ORAL
Qty: 20 | Refills: 0 | Status: ACTIVE | COMMUNITY
Start: 2021-06-18

## 2021-06-30 RX ORDER — METOPROLOL SUCCINATE 25 MG/1
25 TABLET, EXTENDED RELEASE ORAL
Qty: 30 | Refills: 0 | Status: ACTIVE | COMMUNITY
Start: 2021-06-11

## 2021-06-30 RX ORDER — NAPROXEN 500 MG/1
500 TABLET ORAL
Qty: 6 | Refills: 0 | Status: ACTIVE | COMMUNITY
Start: 2021-06-18

## 2021-06-30 NOTE — ASSESSMENT
[FreeTextEntry1] : Pt is a 47 yo s/p STARR, BS for large fibroid uterus. Pathology benign. Recovering well.

## 2021-06-30 NOTE — DISCUSSION/SUMMARY
[Clean] : was clean [Dry] : was dry [Erythema] : was not erythematous [Ecchymosis] : was not ecchymotic [Seroma] : had no seroma [None] : had no drainage [Normal Skin] : normal appearance [Firm] : soft [Tender] : nontender [Abnormal Bowel Sounds] : normal bowel sounds [Rebound] : no rebound tenderness [Guarding] : no guarding [Incisional Hernia] : no incisional hernia [Mass] : no palpable mass [Doing Well] : is doing well [Excellent Pain Control] : has excellent pain control [No Sign of Infection] : is showing no signs of infection

## 2021-06-30 NOTE — REASON FOR VISIT
[Post Op] : post op visit [de-identified] : 6/18/21 [de-identified] : STARR BS [de-identified] : Pt presents for post-op check. She reports doing better everyday and being more mobile. Still has some discomfort, but managed with anti-inflammatory medications. No vaginal bleeding, no fevers/chills. Normal appetite and bowel movements and urination.

## 2021-06-30 NOTE — END OF VISIT
[FreeTextEntry3] : Follow up in 4 weeks for cuff check [FreeTextEntry2] : Rosina Mahan MA was present the entire duration of the patient interaction and gynecological exam.\par

## 2021-08-03 ENCOUNTER — APPOINTMENT (OUTPATIENT)
Dept: GYNECOLOGIC ONCOLOGY | Facility: CLINIC | Age: 46
End: 2021-08-03
Payer: COMMERCIAL

## 2021-08-03 VITALS
BODY MASS INDEX: 36.1 KG/M2 | DIASTOLIC BLOOD PRESSURE: 87 MMHG | OXYGEN SATURATION: 100 % | HEART RATE: 109 BPM | SYSTOLIC BLOOD PRESSURE: 131 MMHG | HEIGHT: 67 IN | WEIGHT: 230 LBS

## 2021-08-03 DIAGNOSIS — D25.9 LEIOMYOMA OF UTERUS, UNSPECIFIED: ICD-10-CM

## 2021-08-03 PROCEDURE — 99024 POSTOP FOLLOW-UP VISIT: CPT

## 2021-08-03 NOTE — END OF VISIT
[FreeTextEntry3] : Discharge to routine gynecologic care [FreeTextEntry2] : Rosina Mahan MA was present the entire duration of the patient interaction and gynecological exam.\par

## 2021-08-03 NOTE — ASSESSMENT
[FreeTextEntry1] : Pt is a 45 yo s/p STARR, BS for large fibroid uterus. Pathology benign. Recovered well.

## 2021-08-03 NOTE — REASON FOR VISIT
[Post Op] : post op visit [de-identified] : 6/18/21 [de-identified] : STARR BS [de-identified] : Pt presents for second post-op check. She reports doing well and having no pain. No nausea/vomiting, no fevers/chills.

## 2022-08-05 NOTE — H&P PST ADULT - HISTORY OF PRESENT ILLNESS
46 yr old female presents with c/o fibroids for years .  ( 8 yr old daughter)  LMP 2019  Deproprovera injections last 2021.  Occ lower abdominal cramping and increased urinary frequency noted.  MRI done 21  with increased fibroids noted. Pt is scheduled for hysterectomy with Dr. Betancourt on 21. Sarecycline Pregnancy And Lactation Text: This medication is Pregnancy Category D and not consider safe during pregnancy. It is also excreted in breast milk.

## 2022-09-28 NOTE — PATIENT PROFILE ADULT - NSPROGENBLOODRESTRICT_GEN_A_NUR
PATIENT INSTRUCTIONS - Podiatry / Foot & Ankle Surgery                We are happy to share the following options for routine foot care which includes toenail trimming and callus management.   This is not a referral.    It is your responsibility to contact the organization to confirm cost and coverage by insurance of any service provided.      ROUTINE FOOT CARE (NAIL TRIMMING / CALLUSES)      [facility] (in home)  [phone]   [facility] (out of pocket)  [phone]  Multiple locations   [facility]  [phone] [facility]  [phone]  Multiple locations   [facility]  [phone]  [facility]  [phone]  Multiple locations   [facility]  [phone]  Multiple locations [facility]   [phone]   Multiple locations             I can remove permanently if desired  Hemoglobin A1 C must be around 7 to proceed             none

## 2024-02-27 NOTE — PATIENT PROFILE ADULT - FALLEN IN THE PAST
[facility]  [facility]  [address] [place] [id number]  Dept: [phone]  Dept Fax: [phone]    Krysten Reyes is a 65 y.o. female who presentstoday for her medical conditions/complaints as noted below.  Krysten Reyes is c/o of  Chief Complaint   Patient presents with    Other     Patient had COVID in December 2023, Patient is still experiencing fatigue, patient is concerned about healing from left hip replacement.    Health Maintenance     Declined Mammogram         HPI:     Here to discuss long covid fatigue and slow improvement after her left hip replacement in December  She feels she recovered much quicker after her right total hip and would just like to discuss  Has had f/u with Dr. Longoria 2/7/24 who reported excellent recovery to that point   They reviewed home exercise regimen    Would like steroid and muscle relaxant for continued pain  Using fioricet for HA and working well, needs refill         Hemoglobin A1C (%)   Date Value   12/05/2019 5.6   01/07/2019 5.4   11/28/2016 5.2             ( goal A1C is < 7)   No components found for: \"LABMICR\"  LDL Cholesterol (mg/dL)   Date Value   03/29/2022 119   07/14/2020 101   12/05/2019 118       (goal LDL is <100)   AST (U/L)   Date Value   02/27/2024 39 (H)     ALT (U/L)   Date Value   02/27/2024 41 (H)     BUN (mg/dL)   Date Value   02/27/2024 7 (L)     BP Readings from Last 3 Encounters:   02/27/24 132/80   12/14/23 (!) 140/69   11/30/23 134/79          ([id number])    Past Medical History:   Diagnosis Date    Abnormal weight gain 12/04/2020    Arthritis     COPD (chronic obstructive pulmonary disease) (HCC)     Elbow pain 12/19/2014    Fatigue 04/07/2015    Headache 3-1-23    Relentless took sinusitis med diocyclmean?    Hip pain 12/19/2014    Hyperglycemia 04/07/2015    Hypothyroidism     Neck pain 04/07/2015      Past Surgical History:   Procedure Laterality Date    ABDOMINAL ADHESION SURGERY      pt states 17 
no

## 2024-07-15 ENCOUNTER — EMERGENCY (EMERGENCY)
Facility: HOSPITAL | Age: 49
LOS: 1 days | Discharge: HOME | End: 2024-07-15
Attending: INTERNAL MEDICINE | Admitting: INTERNAL MEDICINE
Payer: COMMERCIAL

## 2024-07-15 VITALS
OXYGEN SATURATION: 98 % | DIASTOLIC BLOOD PRESSURE: 78 MMHG | TEMPERATURE: 98 F | RESPIRATION RATE: 18 BRPM | HEART RATE: 75 BPM | SYSTOLIC BLOOD PRESSURE: 122 MMHG

## 2024-07-15 VITALS
HEART RATE: 83 BPM | OXYGEN SATURATION: 99 % | DIASTOLIC BLOOD PRESSURE: 90 MMHG | SYSTOLIC BLOOD PRESSURE: 134 MMHG | TEMPERATURE: 98 F | HEIGHT: 67 IN | RESPIRATION RATE: 18 BRPM | WEIGHT: 240.08 LBS

## 2024-07-15 LAB
APPEARANCE UR: CLEAR — SIGNIFICANT CHANGE UP
BACTERIA # UR AUTO: ABNORMAL /HPF
BILIRUB UR-MCNC: NEGATIVE — SIGNIFICANT CHANGE UP
COLOR SPEC: YELLOW — SIGNIFICANT CHANGE UP
DIFF PNL FLD: NEGATIVE — SIGNIFICANT CHANGE UP
EPI CELLS # UR: 3 — SIGNIFICANT CHANGE UP
GLUCOSE UR QL: NEGATIVE MG/DL — SIGNIFICANT CHANGE UP
KETONES UR-MCNC: NEGATIVE MG/DL — SIGNIFICANT CHANGE UP
LEUKOCYTE ESTERASE UR-ACNC: ABNORMAL
NITRITE UR-MCNC: NEGATIVE — SIGNIFICANT CHANGE UP
PH UR: 5.5 — SIGNIFICANT CHANGE UP (ref 5–8)
PROT UR-MCNC: NEGATIVE MG/DL — SIGNIFICANT CHANGE UP
RBC CASTS # UR COMP ASSIST: 2 /HPF — SIGNIFICANT CHANGE UP (ref 0–4)
SP GR SPEC: 1.02 — SIGNIFICANT CHANGE UP (ref 1–1.03)
UROBILINOGEN FLD QL: 1 MG/DL — SIGNIFICANT CHANGE UP (ref 0.2–1)
WBC UR QL: 20 /HPF — HIGH (ref 0–5)

## 2024-07-15 PROCEDURE — 99284 EMERGENCY DEPT VISIT MOD MDM: CPT

## 2024-07-15 PROCEDURE — 87186 SC STD MICRODIL/AGAR DIL: CPT

## 2024-07-15 PROCEDURE — 87086 URINE CULTURE/COLONY COUNT: CPT

## 2024-07-15 PROCEDURE — 87077 CULTURE AEROBIC IDENTIFY: CPT

## 2024-07-15 PROCEDURE — 81001 URINALYSIS AUTO W/SCOPE: CPT

## 2024-07-15 RX ORDER — CEFUROXIME SODIUM 7.5 G
1 VIAL (EA) INTRAVENOUS
Qty: 14 | Refills: 0
Start: 2024-07-15 | End: 2024-07-21

## 2024-07-15 RX ORDER — CEFUROXIME SODIUM 7.5 G
500 VIAL (EA) INTRAVENOUS ONCE
Refills: 0 | Status: ACTIVE | OUTPATIENT
Start: 2024-07-15 | End: 2024-07-15

## 2024-07-15 RX ORDER — LIDOCAINE HCL 28 MG/G
1 GEL TOPICAL
Qty: 2 | Refills: 0
Start: 2024-07-15 | End: 2024-07-24

## 2024-07-15 RX ORDER — OXYCODONE AND ACETAMINOPHEN 5; 325 MG/1; MG/1
1 TABLET ORAL
Qty: 8 | Refills: 0
Start: 2024-07-15 | End: 2024-07-16

## 2024-07-15 RX ORDER — LIDOCAINE HCL 28 MG/G
1 GEL TOPICAL ONCE
Refills: 0 | Status: COMPLETED | OUTPATIENT
Start: 2024-07-15 | End: 2024-07-15

## 2024-07-15 RX ORDER — ACETAMINOPHEN 325 MG
1 TABLET ORAL
Qty: 30 | Refills: 0
Start: 2024-07-15 | End: 2024-07-24

## 2024-07-15 RX ORDER — DIAZEPAM 10 MG/1
5 TABLET ORAL ONCE
Refills: 0 | Status: DISCONTINUED | OUTPATIENT
Start: 2024-07-15 | End: 2024-07-15

## 2024-07-15 RX ORDER — LIDOCAINE HYDROCHLORIDE 20 MG/ML
10 INJECTION, SOLUTION EPIDURAL; INFILTRATION; INTRACAUDAL; PERINEURAL ONCE
Refills: 0 | Status: ACTIVE | OUTPATIENT
Start: 2024-07-15 | End: 2024-07-15

## 2024-07-15 RX ADMIN — LIDOCAINE HCL 1 PATCH: 28 GEL TOPICAL at 10:46

## 2024-07-15 RX ADMIN — DIAZEPAM 5 MILLIGRAM(S): 10 TABLET ORAL at 10:46

## 2024-07-19 ENCOUNTER — APPOINTMENT (OUTPATIENT)
Dept: ORTHOPEDIC SURGERY | Facility: CLINIC | Age: 49
End: 2024-07-19